# Patient Record
(demographics unavailable — no encounter records)

---

## 2017-07-11 NOTE — RADIOLOGY REPORT (SQ)
EXAM DESCRIPTION:  CT CHEST WITHOUT



COMPLETED DATE/TIME:  7/11/2017 9:20 am



REASON FOR STUDY:  LUNG CA (C34.11,C34.12) C34.11  MALIGNANT NEOPLASM OF UPPER LOBE, RIGHT BRONCHUS O
R L



COMPARISON:  CT dated 4/7/2017 and 4/4/2016.  PET-CT dated 9/30/2016 and 6/10/2016.



TECHNIQUE:  CT scan performed of the chest without intravenous contrast.  Images reviewed with lung, 
soft tissue and bone windows.  Reconstructed coronal and sagittal MPR images reviewed.  All images st
ored on PACS.

All CT scanners at this facility use dose modulation, iterative reconstruction, and/or weight based d
osing when appropriate to reduce radiation dose to as low as reasonably achievable (ALARA).

[facility]: Dose Right  [facility]: CareDose    [facility]: Dose Right    [facility]: Teradose 4D    [facility]: Smart Technologies



RADIATION DOSE:  Up-to-date CT equipment and radiation dose reduction techniques were employed. CTDIv
ol: 13.8 mGy. DLP: 497 mGy-cm. mGy.



LIMITATIONS:  No technical limitations.



FINDINGS:  LUNGS AND PLEURA: Stable surgical changes in both lungs.  Focal density in the posterior l
eft upper lobe, abutting the major fissure, with associated surgical clips, unchanged, most likely po
stoperative scar.  No new nodules or masses.  No infiltrates.  No pleural effusion or pleural thicken
ing.

HILAR AND MEDIASTINAL STRUCTURES: Stable 1.2 cm lymph node in the anterior mediastinum.  Otherwise no
 significant adenopathy.  No obvious aneurysm.

HEART AND VASCULAR STRUCTURES: No aneurysm.  No pericardial effusion.  Coronary artery calcifications
.

UPPER ABDOMEN: No significant findings.  Possible small gallstone.  Limited exam.

THYROID AND OTHER SOFT TISSUES: Stable thyromegaly with heterogenous appearance.  No adenopathy.

BONES: No significant finding.

HARDWARE: None in the chest.

OTHER: No other significant findings.



IMPRESSION:  STABLE APPEARANCE OF THE CHEST.  STABLE SURGICAL CHANGES IN BOTH LUNGS AND SMALL LYMPH N
ODE IN THE ANTERIOR MEDIASTINUM.  NO CT EVIDENCE OF PROGRESSION OR OTHER SIGNIFICANT CHANGE.



TECHNICAL DOCUMENTATION:  JOB ID:  [id number]

Quality ID # 436: Final reports with documentation of one or more dose reduction techniques (e.g., Au
tomated exposure control, adjustment of the mA and/or kV according to patient size, use of iterative 
reconstruction technique)

 2011 [employer]- All Rights Reserved

## 2017-08-04 NOTE — RADIOLOGY REPORT (SQ)
EXAM DESCRIPTION:  SOFT TISSUE NECK



COMPLETED DATE/TIME:  8/4/2017 2:09 pm



REASON FOR STUDY:  sore throat after pork



COMPARISON:  None.



NUMBER OF VIEWS:  Two views.



TECHNIQUE:  AP and lateral radiographic image of the soft tissues of the neck.



LIMITATIONS:  None.



FINDINGS:  EPIGLOTTIS: Normal.  Contour normal.  Aryepiglottic folds normal.

PREVERTEBRAL SOFT TISSUES: Normal.  No soft tissue swelling.

SUBGLOTTIC AREA: Normal.  No narrowing.

RETROPHARYNGEAL SPACE: Normal.  No soft tissue masses.

BONY STRUCTURES: Degenerative disc changes are present at C5-6 and C6-7.  Anterior osteophytes are pr
esent from C4-C7.

LUNG APICES: Normal.

OTHER: No radiopaque foreign bodies



IMPRESSION:  1.  Unremarkable soft tissues of the neck.

2.  Cervical degenerative disc disease and spondylosis.



TECHNICAL DOCUMENTATION:  JOB ID:  [id number]

 2011 [employer]- All Rights Reserved

## 2017-08-04 NOTE — ER DOCUMENT REPORT
ED General





- General


Chief Complaint: Sore Throat


Stated Complaint: SORE THROAT


Time Seen by Provider: 08/04/17 13:31


Mode of Arrival: Ambulatory


Information source: Patient


TRAVEL OUTSIDE OF THE [place] IN LAST 30 DAYS: No





- HPI


Patient complains to provider of: Sore throat, difficulty swallowing


Onset: This morning


Onset/Duration: Sudden


Quality of pain: Achy


Severity: Mild


Associated symptoms: Sore throat


Notes: 


Patient is a 73-year-old male who presents to the emergency room complaining of 

sore throat with foreign body sensation, states it started after he ate some 

pork chops at lunchtime, states he feels as though he has a piece of pork chop 

stuck in his throat, since then he has been able to eat some crackers, and 

drinks some juice without any difficulty, he still feels some irritation in the 

back of his throat, denies any difficulty breathing, no chest pain, no 

shortness of breath, no history of similar symptoms previously





- Related Data


Allergies/Adverse Reactions: 


 





No Known Allergies Allergy (Verified 08/04/17 12:57)


 











Past Medical History





- General


Information source: Patient





- Social History


Smoking Status: Former Smoker


Chew tobacco use (# tins/day): No


Frequency of alcohol use: None


Drug Abuse: None


Family History: Reviewed & Not Pertinent





- Past Medical History


Cardiac Medical History: Reports: Hx Hypertension


Renal/ Medical History: Denies: Hx Peritoneal Dialysis


Past Surgical History: Reports: Hx Inguinal Hernia





- Immunizations


Immunizations up to date: Yes


Hx Diphtheria, Pertussis, Tetanus Vaccination: Yes





Review of Systems





- Review of Systems


Constitutional: No symptoms reported


EENT: See HPI


Cardiovascular: No symptoms reported


Respiratory: No symptoms reported


Gastrointestinal: No symptoms reported


Genitourinary: No symptoms reported


Male Genitourinary: No symptoms reported


Musculoskeletal: No symptoms reported


Skin: No symptoms reported


Hematologic/Lymphatic: No symptoms reported


Neurological/Psychological: No symptoms reported


-: Yes All other systems reviewed and negative





Physical Exam





- Vital signs


Vitals: 


 











Temp Pulse Resp BP Pulse Ox


 


 98.8 F   69   18   149/70 H  99 


 


 08/04/17 12:55  08/04/17 12:55  08/04/17 12:55  08/04/17 12:55  08/04/17 12:55











Interpretation: Normal





- General


General appearance: Appears well, Alert





- HEENT


Head: Normocephalic, Atraumatic


Eyes: Normal


Conjunctiva: Normal


Extraocular movements intact: Yes


Eyelashes: Normal


Pupils: PERRL


Pharynx: Erythema.  No: Exudate, Tonsillar hypertrophy, Uvular edema, Potential 

airway comprom.


Neck: Normal





- Respiratory


Respiratory status: No respiratory distress


Chest status: Nontender


Breath sounds: Normal


Chest palpation: Normal





- Cardiovascular


Rhythm: Regular


Heart sounds: Normal auscultation


Murmur: No





- Abdominal


Inspection: Normal


Distension: No distension


Bowel sounds: Normal


Tenderness: Nontender


Organomegaly: No organomegaly





- Back


Back: Normal, Nontender





- Extremities


General upper extremity: Normal inspection, Nontender, Normal color, Normal ROM

, Normal temperature


General lower extremity: Normal inspection, Nontender, Normal color, Normal ROM

, Normal temperature, Normal weight bearing.  No: Antonio's sign





- Neurological


Neuro grossly intact: Yes


Cognition: Normal


Orientation: AAOx4


[place] Coma Scale Eye Opening: Spontaneous


Artemio Coma Scale Verbal: Oriented


Artemio Coma Scale Motor: Obeys Commands


[place] Coma Scale Total: 15


Speech: Normal


Motor strength normal: LUE, RUE, LLE, RLE


Sensory: Normal





- Psychological


Associated symptoms: Normal affect, Normal mood





- Skin


Skin Temperature: Warm


Skin Moisture: Dry


Skin Color: Normal





Course





- Re-evaluation


Re-evalutation: 


08/04/17 15:53


Patient reports feeling some improvement of symptoms since having a GI cocktail

, he was also able to drink a large glass of water afterwards, I believe 

symptoms are likely related to esophageal irritation from swallowing a large 

piece of pork that he initially thought was impacted, since he is able to drink 

a large amount of water is unlikely that he has any significant knee impaction 

at this point in time, therefore patient was discharged with instructions for 

follow-up with gastroenterology, advised to return if symptoms worsen, patient 

acknowledges understanding and agreement with this plan














- Vital Signs


Vital signs: 


 











Temp Pulse Resp BP Pulse Ox


 


 98.8 F   65   18   124/81   96 


 


 08/04/17 12:55  08/04/17 16:14  08/04/17 16:14  08/04/17 16:14  08/04/17 16:14














- Laboratory


Result Diagrams: 


 08/04/17 13:40





 08/04/17 13:40


Laboratory results interpreted by me: 


 











  08/04/17 08/04/17





  13:40 13:40


 


Hgb  13.1 L 


 


RDW  15.9 H 


 


BUN   21 H


 


Est GFR (Non-Af Amer)   57 L














- Diagnostic Test


Radiology reviewed: Image reviewed, Reports reviewed





Discharge





- Discharge


Clinical Impression: 


 Throat irritation





Condition: Stable


Disposition: HOME, SELF-CARE


Instructions:  Sore Throat ([facility]), Gastroenterology


Additional Instructions: 


Follow up with your primary care provider in one to 2 days.  Return to the 

emergency room immediately if symptoms worsen or any additional concerns.

## 2018-01-21 NOTE — RADIOLOGY REPORT (SQ)
EXAM DESCRIPTION:  CT CHEST WITHOUT



COMPLETED DATE/TIME:  1/20/2018 10:47 am



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



COMPARISON:  10/11/2017 and 7/11/2017.



TECHNIQUE:  CT scan performed of the chest without intravenous contrast.  Images reviewed with lung, 
soft tissue and bone windows.  Reconstructed coronal and sagittal MPR images reviewed.  All images st
ored on PACS.

All CT scanners at this facility use dose modulation, iterative reconstruction, and/or weight based d
osing when appropriate to reduce radiation dose to as low as reasonably achievable (ALARA).

[facility]: Dose Right  [facility]: CareDose    [facility]: Dose Right    [facility]: Teradose 4D    [facility]: Smart [employer]



RADIATION DOSE:  CT Rad equipment meets quality standard of care and radiation dose reduction techniq
ues were employed. CTDIvol: 17.8 mGy. DLP: 669 mGy-cm. mGy.



LIMITATIONS:  No technical limitations.



FINDINGS:  LUNGS AND PLEURA: Stable surgical changes with scarring.  Focal nodule in the left upper l
obe anterior to the major fissure unchanged, measuring 1 x 1.9 cm.  No new nodules or masses.  No ple
ural effusion or pleural thickening.

HILAR AND MEDIASTINAL STRUCTURES: Several lymph nodes in the paratracheal region and anterior mediast
inum are unchanged.  The largest anterior mediastinal lymph node measures 1.2 cm.  There is an enlarg
ing mass in the upper posterior mediastinum, adjacent to the T2 vertebral body, located to the right 
side of the esophagus, measuring 1.2 x 2.2 cm (series 3, image 9).

HEART AND VASCULAR STRUCTURES: No aneurysm.  No pericardial effusion.

UPPER ABDOMEN: No significant findings.  Limited exam.

THYROID AND OTHER SOFT TISSUES: Diffusely enlarged heterogenous thyroid.  Focal coarse calcification.
  No adenopathy.

BONES: No significant finding.

HARDWARE: None in the chest.

OTHER: No other significant findings.



IMPRESSION:

1. STABLE SURGICAL CHANGES IN THE LUNGS.  STABLE NODULARITY IN THE LEFT UPPER LOBE ADJACENT TO THE MA
STEFF FISSURE.  NO PROGRESSION.  NO NEW NODULES OR MASSES.

2. STABLE SHOTTY ADENOPATHY IN THE ANTERIOR MEDIASTINUM AND PARATRACHEAL REGION.

3. ENLARGING MASS IN THE UPPER POSTERIOR MEDIASTINUM AS DESCRIBED.  THIS PRESUMABLY REPRESENTS AN ENL
ARGING LYMPH NODE.  FURTHER EVALUATION WITH PET SCAN AND POSSIBLY MRI OF THE THORACIC SPINE MAY BE CO
NSIDERED.

4. DIFFUSE THYROMEGALY.



TECHNICAL DOCUMENTATION:  JOB ID:  [id number]

Quality ID # 436: Final reports with documentation of one or more dose reduction techniques (e.g., Au
tomated exposure control, adjustment of the mA and/or kV according to patient size, use of iterative 
reconstruction technique)

 2011 [employer]- All Rights Reserved
8

## 2018-01-29 NOTE — RADIOLOGY REPORT (SQ)
EXAM DESCRIPTION:  PET CT SKULL/THIGH



COMPLETED DATE/TIME:  1/28/2018 8:26 pm



REASON FOR STUDY:  LUNG CANCER C34.11  MALIGNANT NEOPLASM OF UPPER LOBE, RIGHT BRONCHUS OR L



COMPARISON:  9/30/2016



RADIONUCLIDE AND DOSE:  10.2 mCi F18 FDG

The route of agent administration: Intravenous



FASTING BLOOD SUGAR:  89 mg/dl



CONTRAST TYPE AND DOSE:  No CT contrast given.



TECHNIQUE:  Blood glucose level was verified.  Above dose of FDG was injected intravenously.  2-D seg
mented attenuation correction images were obtained from the base of the skull to the midthighs.  Nonc
ontrast CT images were obtained for attenuation correction and fusion with emission images.  CT image
s were performed without oral or intravenous contrast and are not sensitive for parenchymal lesions. 
 A series of overlapping emission PET images were obtained.  Images reviewed and manipulated at [facility] work station by the radiologist.  Images stored on PACS.



LIMITATIONS:  None.



FINDINGS:  HEAD AND NECK: No areas of abnormal metabolic activity in the soft tissues of the head and
 neck.

CHEST: Station 2R node measuring 1.4 x 2.6 cm and 10.0 SUV.

Pleural thickening right lower lobe measuring 5.8 SUV.  Margins roughly 1.2 x 2.2 cm.  There is a lyt
ic lesion in the adjacent rib.

ABDOMEN AND PELVIS: No areas of abnormal metabolic activity in the abdomen or pelvis.  Expected physi
ologic activity is present in the genitourinary system and bowel.

PROXIMAL LOWER EXTREMITIES: No areas of abnormal metabolic activity in the soft tissues of the lower 
extremities.

BONES: No abnormal metabolic activity in the visualized skeleton.

ADDITIONAL CT FINDINGS: Interval wedge resection of middle lobe and left apical nodules.  Residual sc
ar tissue left apex, non hypermetabolic.

Cholelithiasis.

OTHER: No other significant findings.



IMPRESSION:  Hypermetabolic station 2 R node.  Hypermetabolic pleural lesion right lower lobe with ad
jacent rib destruction.



TECHNICAL DOCUMENTATION:  JOB ID:  [id number]

 2011 [employer]- All Rights Reserved

## 2018-02-19 NOTE — RADIOLOGY REPORT (SQ)
EXAM DESCRIPTION:  PORTACATH INSERTION



COMPLETED DATE/TIME:  2/19/2018 10:50 am



REASON FOR STUDY:  C34.11 RT UPPER LOBE CA C34.11  MALIGNANT NEOPLASM OF UPPER LOBE, RIGHT BRONCHUS O
R L



COMPARISON:  AP chest 2/19/2018



FLUOROSCOPY TIME:  Less than 5 seconds

9 series of digital radiographic  images saved to PACS.



TECHNIQUE:  Intra-operative images acquired during surgical procedure to evaluate progress.

NUMBER OF IMAGES: 9 series of digital radiographic images



LIMITATIONS:  None.



FINDINGS:  Intra procedural imaging and fluoro during left-sided permanent central line placement



IMPRESSION:  Intra procedural imaging fluoro



COMMENT:  Quality :  Final reports for procedures using fluoroscopy that document radiation exp
osure indices, or exposure time and number of fluorographic images (if radiation exposure indices are
 not available)

Please consult full operative report of the attending physician for description of the procedure.



TECHNICAL DOCUMENTATION:  JOB ID:  [id number]

 2011 [employer]- All Rights Reserved

## 2018-02-19 NOTE — RADIOLOGY REPORT (SQ)
EXAM DESCRIPTION:  CHEST SINGLE VIEW



COMPLETED DATE/TIME:  2/19/2018 9:22 am



REASON FOR STUDY:  PREOP



COMPARISON:  None.



EXAM PARAMETERS:  NUMBER OF VIEWS: One view.

TECHNIQUE: Single frontal radiographic view of the chest acquired.

RADIATION DOSE: NA

LIMITATIONS: None.



FINDINGS:  LUNGS AND PLEURA: Chronic changes in the right base.  No acute opacities, masses or pneumo
thorax. No pleural effusion.

MEDIASTINUM AND HILAR STRUCTURES: No masses.  Contour normal.

HEART AND VASCULAR STRUCTURES: Heart normal in size.  Normal vasculature.

BONES: No acute findings.

HARDWARE: None in the chest.

OTHER: No other significant finding.



IMPRESSION:  Chronic changes in the medial right base without evidence of acute cardiopulmonary disea
se.



TECHNICAL DOCUMENTATION:  JOB ID:  [id number]

 2011 [employer]- All Rights Reserved

## 2018-02-19 NOTE — OPERATIVE REPORT
Operative Report


DATE OF SURGERY: 02/19/18


PREOPERATIVE DIAGNOSIS: #1 lung cancer, multiple comorbidities.


POSTOPERATIVE DIAGNOSIS: #1 lung cancer, multiple comorbidities.


OPERATION: 1.  Ultrasound evaluation of left internal jugular vein.  2.  Real-

time access in left internal jugular vein.  3.  Port-A-Cath placement via real-

time axis and left internal jugular vein.  4.  Angiogram and interpretation.


SURGEON: LENNOX WILLIAMS 1ST ASSISTANT: None.


ANESTHESIA: Moderate Sedation


TISSUE REMOVED OR ALTERED: Not applicable.


COMPLICATIONS: 





None.


ESTIMATED BLOOD LOSS: 5 mL.


INTRAOPERATIVE FINDINGS: Of a satisfactory left internal jugular vein support 

Port-A-Cath.  Good position of catheter with the tip just down in the right 

atrium.  Smooth flow of contrast through the right atrium, ventricle and 

pulmonary outflow tract.  Easy egress of blood and ingress of heparinized 

solution.


PROCEDURE: 








After obtaining informed consent, the patient was taken to the Cath Lab and 

positioned supine.  The left knee and chest were prepared with chlorhexidine 

and draped out with sterile linen.  After the " universal timeout", in which it 

was verified that the patient continued to receive antibiotic, the procedure 

commenced.  A steriley  sheathed ultrasound probe was used to evaluate the [

right] internal jugular vein.  Local anesthesia was infiltrated adjacent to the 

probe.  Access into the [right] internal jugular vein was obtained using a 

micropuncture needle, followed by micropuncture wire and then a micropuncture 

catheter.  This was followed by introduction of a 0.035 guidewire the tip of 

which was placed down into the inferior vena cava .  The port sites was marked 

, locally anesthetized and incision made.  Dissection now proceeded to the deep 

subcutaneous subcutaneous tissues so that a pocket for the port was made.  

Meticulous hemostasis was secured and the catheter was tunneled between the 2 

incisions.  Proximally, the catheter was  now positioned using a peel-away 

sheath.  Distally the catheter was tailored to an appropriate length and then 

mated to the port using the contained fixating device.  The port was now placed 

in the pocket and the catheter optimally positioned.  The port was accessed 

with a Ibarra needle and an angiogram done under digital subtraction.  The 

findings as dictated.





With adequate and satisfactory positioning, both lumens of the chamber were 

irrigated with heparinized solution.  The wounds were now closed using 

interrupted 3-0 PDS to the subcutaneous tissues and a continuous subcuticular 

suture of 4-0 Monocryl to the skin.  These are reinforced with Steri-Strips 

over benzoin and then dressings applied.





Time: 0.0 minute.





Dose: 14.9 m Gy





Contrast: 5 mls. Isovue 300.





 Copies of the dictated operative report for Dr. Lennox Williams MD.

## 2018-04-12 NOTE — RADIOLOGY REPORT (SQ)
EXAM DESCRIPTION:  CT CHEST WITH; CT ABD/PELVIS WITH IV ONLY



COMPLETED DATE/TIME:  4/12/2018 9:25 am



REASON FOR STUDY:  LUNG CA (C34.11) C34.11  MALIGNANT NEOPLASM OF UPPER LOBE, RIGHT BRONCHUS OR L



COMPARISON:  PET-CT 6/10/2016, 9/30/2016, 1/28/2018

CT chest 4/4/2016, 10/11/2017, 1/20/2018



CONTRAST TYPE AND DOSE:  contrast/concentration: Isovue 370.00 mg/ml; Total Contrast Delivered: 100.0
 ml; Total Saline Delivered: 72.0 ml



RENAL FUNCTION:  Creatinine 1.0



TECHNIQUE:  CT scan of the chest performed using helical scanning technique with dynamic intravenous 
contrast injection.  Images reviewed with lung, soft tissue and bone windows. Reconstructed coronal a
nd sagittal MPR images reviewed.  All images stored on PACS.

CT scan of the abdomen and pelvis performed with intravenous and without oral contrastusing helical s
tu technique with dynamic intravenous contrast injection.  Images reviewed with lung, soft tissu
e and bone windows.  Reconstructed coronal and sagittal MPR images reviewed.  Delayed images for eval
uation of the urinary system also acquired and evaluated. All images stored on PACS.

All CT scanners at this facility use dose modulation, iterative reconstruction, and/or weight based d
osing when appropriate to reduce radiation dose to as low as reasonably achievable (ALARA).

[facility]: Dose Right  [facility]: CareDose    [facility]: Dose Right    [facility]: Teradose 4D    [facility]: [employer]



RADIATION DOSE:  CT Rad equipment meets quality standard of care and radiation dose reduction techniq
ues were employed. CTDIvol: 12.8 - 16.7 mGy. DLP: 2296 mGy-cm. .



LIMITATIONS:  None.



FINDINGS:  CHEST:

LUNGS AND PLEURA: Post right middle lobe resection with staple line present.  No recurrent soft tissu
e along the staple line or right hilum.

Post wedge resection posterior aspect left upper lobe, with staple line.  There stable scarring in th
e inferior aspect of the staple line, measuring 2 x 1.2 cm in size, similar compared to 10/11/2017 an
d 1/28/2018.

HILAR AND MEDIASTINAL STRUCTURES: No identified masses or abnormal nodes.  The 2.6 x 1.4 cm lymph nod
e seen on 1/28/2018 has decreased in size significantly, now 0.6 x 0.6 cm on image 11.

HEART AND VASCULAR STRUCTURES: No aneurysm or dissection.  No central pulmonary emboli.  No pericardi
al effusion.

HARDWARE: Left permanent central line tip superior vena cava.

THYROID AND OTHER SOFT TISSUES: Stable thyromegaly

BONES: No significant finding.

OTHER: No other significant finding.

ABDOMEN AND PELVIS:

LIVER: Normal size. No masses.  No dilated ducts.

SPLEEN: Normal size. No focal lesions.

PANCREAS: No masses. No significant calcifications. No adjacent inflammation or peripancreatic fluid 
collections. Pancreatic duct not dilated.

GALLBLADDER: Tiny stones in the gallbladder, without gallbladder wall thickening or pericholecystic f
luid.

ADRENAL GLANDS: No significant masses or asymmetry.

RIGHT KIDNEY AND URETER: No solid masses.  Multiple subcentimeter right midpole cortical cysts.  No s
ignificant calcification. No hydronephrosis or hydroureter.

LEFT KIDNEY AND URETER: No solid masses.  Multiple subcentimeter left midpole cortical cysts.  No sig
nificant calcification. No hydronephrosis or hydroureter.

AORTA AND VESSELS: No abdominal aortic aneurysm. No abdominal aortic dissection. Renal arteries, SMA,
 celiac without stenosis.  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
rtery 3 cm in diameter.

RETROPERITONEUM: No retroperitoneal adenopathy, hemorrhage or masses.

BOWEL AND PERITONEAL CAVITY: No masses or inflammatory changes. No free fluid or peritoneal masses.

APPENDIX: Normal.

ABDOMINAL WALL: Intact umbilical hernia repair

PELVIS: No mass or free fluid.  Normal bladder.

BONES: No significant or acute findings.

OTHER: No other significant finding.



IMPRESSION:  Decrease in size of upper mediastinal lymph node seen on 1/28/2018, currently 0.6 cm in 
greatest diameter

Stable post therapeutic changes in the chest

No CT evidence of metastatic disease to the abdomen or pelvis.  Stable right common iliac artery aneu
rysm 3 cm in diameter.  Tiny gallstones.



TECHNICAL DOCUMENTATION:  JOB ID:  [id number]

Quality ID # 436: Final reports with documentation of one or more dose reduction techniques (e.g., Au
tomated exposure control, adjustment of the mA and/or kV according to patient size, use of iterative 
reconstruction technique)

 2011 [employer]- All Rights Reserved



Reading location - IP/workstation name: [facility]

## 2018-06-11 NOTE — RADIOLOGY REPORT (SQ)
EXAM DESCRIPTION:  PET CT SKULL/THIGH



COMPLETED DATE/TIME:  6/10/2018 8:30 pm



REASON FOR STUDY:  LUNG CANCER C34.11  MALIGNANT NEOPLASM OF UPPER LOBE, RIGHT BRONCHUS OR L



COMPARISON:  CT chest abdomen pelvis 4/12/2018

PET-CT 1/28/2018, 9/30/2016



RADIONUCLIDE AND DOSE:  11.5 mCi F18 FDG

The route of agent administration: Intravenous



FASTING BLOOD SUGAR:  94 mg/dl



CONTRAST TYPE AND DOSE:  No CT contrast given.



TECHNIQUE:  Blood glucose level was verified.  Above dose of FDG was injected intravenously.  2-D seg
mented attenuation correction images were obtained from the base of the skull to the midthighs.  Nonc
ontrast CT images were obtained for attenuation correction and fusion with emission images.  CT image
s were performed without oral or intravenous contrast and are not sensitive for parenchymal lesions. 
 A series of overlapping emission PET images were obtained.  Images reviewed and manipulated at [facility] work station by the radiologist.  Images stored on PACS.



LIMITATIONS:  None.



FINDINGS:  HEAD AND NECK: No areas of abnormal metabolic activity in the soft tissues of the head and
 neck.

CHEST: No metabolically active mediastinal adenopathy.  Previously biopsied hypermetabolic 2R mediast
inal lymph node seen on 1/28/2018 is no longer identified.  There is 2 x 1 cm bandlike scarring along
 the posterior aspect left upper lobe post wedge resection of a nodule.  This is non metabolic.  Cee
ent is post right middle lobe resection.  No metabolically active tissue is seen along the right midd
le lobe staple line.

ABDOMEN AND PELVIS: No areas of abnormal metabolic activity in the abdomen or pelvis.  Expected physi
ologic activity is present in the genitourinary system and bowel.

PROXIMAL LOWER EXTREMITIES: No areas of abnormal metabolic activity in the soft tissues of the lower 
extremities.

BONES: No abnormal metabolic activity in the visualized skeleton.

ADDITIONAL CT FINDINGS: Stones in the gallbladder.  Intact ventral hernia repair.  3 cm aneurysm righ
t common iliac artery.  Diffuse thyromegaly.  Left permanent central line tip superior vena cava.

OTHER: No other significant findings.



IMPRESSION:  No hypermetabolic lesions worrisome for residual or recurrent tumor.



TECHNICAL DOCUMENTATION:  JOB ID:  [id number]

 2011 [employer]- All Rights Reserved



Reading location - IP/workstation name: [facility]

## 2018-08-06 NOTE — RADIOLOGY REPORT (SQ)
EXAM DESCRIPTION:  CT ABD/PELVIS WITH IV   ORAL



COMPLETED DATE/TIME:  8/6/2018 8:45 am



REASON FOR STUDY:  MALIGNANT NEOPLASM OF UPPER LOBE, RIGHT BRONCHUS OR LUNG C34.11  MALIGNANT NEOPLAS
M OF UPPER LOBE, RIGHT BRONCHUS OR L



COMPARISON:  4/12/2018



TECHNIQUE:  CT scan of the abdomen and pelvis performed using helical scanning technique with dynamic
 intravenous contrast injection.  No oral contrast. Images reviewed with lung, soft tissue, and bone 
windows. Reconstructed coronal and sagittal MPR images reviewed. Delayed images for evaluation of the
 urinary system also acquired. All images stored on PACS.

All CT scanners at this facility use dose modulation, iterative reconstruction, and/or weight based d
osing when appropriate to reduce radiation dose to as low as reasonably achievable (ALARA).

[facility]: Dose Right  [facility]: CareDose    [facility]: Dose Right    [facility]: Teradose 4D    [facility]: Smart Technologies



CONTRAST TYPE AND DOSE:  contrast/concentration: Isovue 370.00 mg/ml; Total Contrast Delivered: 100.0
 ml; Total Saline Delivered: 72.0 ml



RENAL FUNCTION:  Creatinine -0.9

BUN=11



RADIATION DOSE:  CT Rad equipment meets quality standard of care and radiation dose reduction techniq
ues were employed. CTDIvol: 11.2 - 13.5 mGy. DLP: 1834 mGy-cm..



LIMITATIONS:  None.



FINDINGS:  LOWER CHEST:  Please see CT chest report.

LIVER:  Stable appearing homogeneous enhancing density in the liver parenchyma near the region of the
 gallbladder fossa, axial image 23, series 3.  This finding is not well visualized on the post contra
st images, probably on a benign basis.  No dilated ducts.  The hepatic and portal veins are patent.

SPLEEN: Normal size. No focal lesions.

PANCREAS: No masses. No significant calcifications. No adjacent inflammation or peripancreatic fluid 
collections. Pancreatic duct not dilated.

GALLBLADDER:  Small stable gallstones.  No inflammatory changes to suggest cholecystitis.

ADRENAL GLANDS: No significant masses or asymmetry.

RIGHT KIDNEY AND URETER:  Stable complex slightly heterogenous well-circumscribed 1.2 cm x 1.2 cm les
ion in the posteromedial aspect of the mid-lower pole of the right kidney with CT numbers above water
 range, axial image 36, series 8.  There are other smaller stable appearing hypoattenuated lesions ma
y represent cysts.  No hydronephrosis or hydroureter.

LEFT KIDNEY AND URETER:  Stable left renal cysts.  Stable focal cortical scarring along the medial as
pect of the lower pole of the kidney. No hydronephrosis or hydroureter.

AORTA AND VESSELS:  Atherosclerotic changes involving the abdominal aorta and branch vessels.  Stable
 appearing right proximal common iliac artery aneurysm measures 3.0 cm in diameter. No dissection. Re
nal arteries, SMA, celiac without stenosis.

RETROPERITONEUM: No retroperitoneal adenopathy, hemorrhage or masses.

BOWEL AND PERITONEAL CAVITY: No masses or inflammatory changes. No free fluid or peritoneal masses.

APPENDIX: Normal.

PELVIS:  Prior prostatectomy. No free fluid.  Normal bladder.

ABDOMINAL WALL:  Stable post surgical changes right inguinal region may be related to prior hernia re
pair.

BONES:  The osseous structures are stable in appearance.

OTHER: No other significant finding.



IMPRESSION:  1 No significant interval changes since the prior examination dated 4/12/2018.

2.  Stable appearing right proximal common iliac artery aneurysm measures 3.0 cm in diameter.

3.  Bilateral renal cysts.  Stable appearing slightly heterogenous, complex lesion in the mid-lower p
ole of the right kidney.  Further evaluation with Renal ultrasound suggested.

4.  Very small gallstones, stable finding.

5.  Additional stable findings as above.



TECHNICAL DOCUMENTATION:  JOB ID:  [id number]

Quality ID # 436: Final reports with documentation of one or more dose reduction techniques (e.g., Au
tomated exposure control, adjustment of the mA and/or kV according to patient size, use of iterative 
reconstruction technique)

 2011 [employer]- All Rights Reserved



Reading location - IP/workstation name: [email or web address]

## 2018-08-06 NOTE — RADIOLOGY REPORT (SQ)
EXAM DESCRIPTION:  CT CHEST WITH



COMPLETED DATE/TIME:  8/6/2018 8:45 am



REASON FOR STUDY:  MALIGNANT NEOPLASM OF UPPER LOBE, RIGHT BRONCHUS OR LUNG C34.11  MALIGNANT NEOPLAS
M OF UPPER LOBE, RIGHT BRONCHUS OR L



COMPARISON:  4/12/2018



TECHNIQUE:  CT scan of the chest performed using helical scanning technique with dynamic intravenous 
contrast injection.  Images reviewed with lung, soft tissue and bone windows.  Reconstructed coronal 
and sagittal MPR images reviewed.  All images stored on PACS.

All CT scanners at this facility use dose modulation, iterative reconstruction, and/or weight based d
osing when appropriate to reduce radiation dose to as low as reasonably achievable (ALARA).

[facility]: Dose Right  [facility]: CareDose    [facility]: Dose Right    [facility]: Teradose 4D    [facility]: Smart [employer]



CONTRAST TYPE AND DOSE:  100 cc Isovue 370



RENAL FUNCTION:  Creatinine- 0.9

BUN=11



RADIATION DOSE:  Total exam DLP:  1834.21 mGy



LIMITATIONS:  None.



FINDINGS:  LUNGS AND PLEURA:  Stable post operative changes in the right upper-middle lobe.  Stable p
ost operative  changes in the left upper lobe.  As on the prior examination, stable soft tissue densi
ty along the inferior aspect of the staple line, may represent scar. Mild emphysematous changes in th
e lungs.  No pneumothorax or pleural effusion.  The central airways are clear.

HILAR AND MEDIASTINAL STRUCTURES:  Stable appearing mediastinal lymph nodes.

HEART AND VASCULAR STRUCTURES:  Mild atherosclerotic changes involving the abdominal aorta.  Stable m
ild dilatation of the ascending thoracic aorta which measures 4.1-4.2 cm in diameter at the level of 
the main pulmonary artery.  No dissection.  No central pulmonary emboli.  No pericardial effusion.

HARDWARE:  Stable Left Zvrjbn-B-Qvuh catheter.

UPPER ABDOMEN:  Please see CT abdomen report.

THYROID AND OTHER SOFT TISSUES:  The thyroid gland is stable in appearance.  As on the prior examinat
ion, the thyroid gland is prominent in size with multiple hypoattenuated nodules, some of which are h
eterogenous in appearance.

BONES:  The osseous structures are stable in appearance.

OTHER: No other significant finding.



IMPRESSION:  1 No significant interval changes since the prior examination dated 4/12/2018.

2. Stable post operative changes in the lungs.

3.  Additional stable findings as above.



TECHNICAL DOCUMENTATION:  JOB ID:  [id number]

Quality ID # 436: Final reports with documentation of one or more dose reduction techniques (e.g., Au
tomated exposure control, adjustment of the mA and/or kV according to patient size, use of iterative 
reconstruction technique)

 2011 [employer]- All Rights Reserved



Reading location - IP/workstation name: [email or web address]

## 2018-10-04 NOTE — RADIOLOGY REPORT (SQ)
EXAM DESCRIPTION:  VENOUS UNILATERAL UPPER



COMPLETED DATE/TIME:  10/4/2018 2:44 pm



REASON FOR STUDY:  RUE SWELLING M79.609  PAIN IN UNSPECIFIED LIMB M79.89  OTHER SPECIFIED SOFT TISSUE
 DISORDERS



COMPARISON:  None.



TECHNIQUE:  Dynamic and static gray scale and color images acquired of the right arm venous system. S
elected spectral images acquired with additional compression and augmentation maneuvers. The contrala
teral subclavian vein and internal jugular vein were also imaged. Images stored on PACS.



LIMITATIONS:  None.



FINDINGS:  INTERNAL JUGULAR VEIN: Normal phasicity, compression, augmentation. No visualized echogeni
c material on gray scale. No defects on color images. Comparison opposite side normal.

SUBCLAVIAN VEIN: Normal compression, augmentation. No visualized echogenic material on gray scale. No
 defects on color images.

AXILLARY VEIN: Normal compression, augmentation. No visualized echogenic material on gray scale. No d
efects on color images.

BRACHIAL VEIN: Normal compression, augmentation. No visualized echogenic material on gray scale. No d
efects on color images.

BASILIC VEIN: Normal compression, augmentation. No visualized echogenic material on gray scale. No de
fects on color images.

CEPHALIC VEIN: Normal compression, augmentation. No visualized echogenic material on gray scale. No d
efects on color images.

OTHER: No other significant finding.

CONTRALATERAL SUBCLAVIAN VEIN AND INTERNAL JUGULAR VEIN:

Normal phasicity, compression and augmentation. No visualized echogenic material on gray scale. No de
fects on color images.



IMPRESSION:  NO EVIDENCE DVT OR SVT IN THE RIGHT ARM.



TECHNICAL DOCUMENTATION:  JOB ID:  [id number]

 2011 [employer]- All Rights Reserved



Reading location - IP/workstation name: [email or web address]

## 2018-10-29 NOTE — RADIOLOGY REPORT (SQ)
EXAM DESCRIPTION:  CT CHEST WITH



COMPLETED DATE/TIME:  10/29/2018 9:09 am



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 
NEOPLASM OF UPPER LOBE, RIGHT BRONCHUS OR L



COMPARISON:  8/6/2018 and 4/12/2018.



TECHNIQUE:  CT scan of the chest performed using helical scanning technique with dynamic intravenous 
contrast injection.  Images reviewed with lung, soft tissue and bone windows.  Reconstructed coronal 
and sagittal MPR and MIP images reviewed.  All images stored on PACS.

All CT scanners at this facility use dose modulation, iterative reconstruction, and/or weight based d
osing when appropriate to reduce radiation dose to as low as reasonably achievable (ALARA).

[facility]: Dose Right  [facility]: CareDose    [facility]: Dose Right    [facility]: Teradose 4D    [facility]: [employer]



CONTRAST TYPE AND DOSE:  100 mL Omnipaque 350- low osmolar.



RENAL FUNCTION:  BUN 16 creatinine 1.1.



RADIATION DOSE:   .



LIMITATIONS:  None.



FINDINGS:  LUNGS AND PLEURA: Stable surgical changes.  Chronic parenchymal scarring.  Focal scar in t
he posterior left upper lobe unchanged.  No new nodules or masses.  No pleural effusion.  No pleural 
thickening or calcification.

HILAR AND MEDIASTINAL STRUCTURES: No identified masses or abnormal nodes.

HEART AND VASCULAR STRUCTURES: No aneurysm or dissection.  No central pulmonary emboli.  No pericardi
al effusion.

HARDWARE: Vascular port.

UPPER ABDOMEN: No significant findings.  Limited exam.

THYROID AND OTHER SOFT TISSUES: Stable enlargement of the thyroid with heterogenous nodular appearanc
e.  No adenopathy.

BONES: No significant finding.

OTHER: No other significant finding.



IMPRESSION:

1. STABLE CT OF THE CHEST WITH IV CONTRAST.  SURGICAL CHANGES WITH AREAS OF SCARRING, PARTICULARLY IN
 THE POSTERIOR LEFT UPPER LOBE, UNCHANGED.  NO NEW NODULES OR MASSES.  NO EVIDENCE OF RESIDUAL OR REC
URRENT DISEASE OR METASTASIS.

2. STABLE APPEARANCE OF THE THYROID, PRESUMABLY MULTINODULAR GOITER.



TECHNICAL DOCUMENTATION:  JOB ID:  [id number]

Quality ID # 436: Final reports with documentation of one or more dose reduction techniques (e.g., Au
tomated exposure control, adjustment of the mA and/or kV according to patient size, use of iterative 
reconstruction technique)

 2011 [employer]- All Rights Reserved



Reading location - IP/workstation name: [facility]

## 2018-10-29 NOTE — RADIOLOGY REPORT (SQ)
EXAM DESCRIPTION:  CT ABD/PELVIS WITH IV ONLY



COMPLETED DATE/TIME:  10/29/2018 9:09 am



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 
NEOPLASM OF UPPER LOBE, RIGHT BRONCHUS OR L



COMPARISON:  8/6/2018 and 4/12/2018.



TECHNIQUE:  CT scan of the abdomen and pelvis performed using helical scanning technique with dynamic
 intravenous contrast injection.  No oral contrast. Images reviewed with lung, soft tissue, and bone 
windows. Reconstructed coronal and sagittal MPR images reviewed. Delayed images for evaluation of the
 urinary system also acquired. All images stored on PACS.

All CT scanners at this facility use dose modulation, iterative reconstruction, and/or weight based d
osing when appropriate to reduce radiation dose to as low as reasonably achievable (ALARA).

[facility]: Dose Right  [facility]: CareDose    [facility]: Dose Right    [facility]: Teradose 4D    [facility]: [employer]



CONTRAST TYPE AND DOSE:  contrast/concentration: Isovue 350.00 mg/ml; Total Contrast Delivered: 100.0
 ml; Total Saline Delivered: 72.0 ml



RENAL FUNCTION:  BUN 16 creatinine 1.1.



RADIATION DOSE:  CT Rad equipment meets quality standard of care and radiation dose reduction techniq
ues were employed. CTDIvol: 11.1 - 12.8 mGy. DLP: 1839 mGy-cm..



LIMITATIONS:  None.



FINDINGS:  LOWER CHEST: No significant findings. No nodules or infiltrates.

LIVER: Normal size. No masses.  No dilated ducts.

SPLEEN: Normal size. No focal lesions.

PANCREAS: No masses. No significant calcifications. No adjacent inflammation or peripancreatic fluid 
collections. Pancreatic duct not dilated.

GALLBLADDER: Gallstones. No inflammatory changes to suggest cholecystitis.

ADRENAL GLANDS: No significant masses or asymmetry.

RIGHT KIDNEY AND URETER: Stable small cortical cysts.  No solid masses.   No significant calcificatio
ns.   No hydronephrosis or hydroureter.

LEFT KIDNEY AND URETER: Stable small cortical cysts.  No solid masses.   No significant calcification
s.   No hydronephrosis or hydroureter.

AORTA AND VESSELS: No aortic aneurysm.  Stable 3.0 cm aneurysm of the right common iliac artery.  No 
dissection. Renal arteries, SMA, celiac without stenosis.

RETROPERITONEUM: No retroperitoneal adenopathy, hemorrhage or masses.

BOWEL AND PERITONEAL CAVITY: No masses or inflammatory changes. No free fluid or peritoneal masses.

APPENDIX: Normal.

PELVIS: No mass.  No free fluid. Normal bladder.

ABDOMINAL WALL: No masses. No hernias.

BONES: No significant or acute findings.

OTHER: No other significant finding.



IMPRESSION:

1. 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

2. GALLSTONES.

3. SMALL CYSTS IN BOTH KIDNEYS.

4. NO EVIDENCE OF METASTATIC INVOLVEMENT IN THE ABDOMEN OR PELVIS.  NO OTHER SIGNIFICANT OR ACUTE FIN
DING IN THE ABDOMEN OR PELVIS ON CT SCAN WITH IV CONTRAST.



TECHNICAL DOCUMENTATION:  JOB ID:  [id number]

Quality ID # 436: Final reports with documentation of one or more dose reduction techniques (e.g., Au
tomated exposure control, adjustment of the mA and/or kV according to patient size, use of iterative 
reconstruction technique)

 2011 [employer]- All Rights Reserved



Reading location - IP/workstation name: [email or web address]

## 2019-01-21 NOTE — RADIOLOGY REPORT (SQ)
EXAM DESCRIPTION:  CT CHEST WITH; CT ABD/PELVIS WITH IV ONLY



COMPLETED DATE/TIME:  1/21/2019 8:23 am



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



COMPARISON:  PET-CT 6/10/2018

CT chest abdomen and pelvis 10/29/2018, 8/6/2018, 4/12/2018



CONTRAST TYPE AND DOSE:  contrast/concentration: Isovue 350.00 mg/ml; Total Contrast Delivered: 100.0
 ml; Total Saline Delivered: 27.9 ml



RENAL FUNCTION:  Creatinine 1.0



TECHNIQUE:  CT scan of the chest performed using helical scanning technique with dynamic intravenous 
contrast injection.  Images reviewed with lung, soft tissue and bone windows. Reconstructed coronal a
nd sagittal MPR images reviewed.  All images stored on PACS.

CT scan of the abdomen and pelvis performed with intravenous and without oral contrastusing helical s
tu technique with dynamic intravenous contrast injection.  Images reviewed with lung, soft tissu
e and bone windows.  Reconstructed coronal and sagittal MPR images reviewed.  Delayed images for eval
uation of the urinary system also acquired and evaluated. All images stored on PACS.

All CT scanners at this facility use dose modulation, iterative reconstruction, and/or weight based d
osing when appropriate to reduce radiation dose to as low as reasonably achievable (ALARA).

[facility]: Dose Right  [facility]: CareDose    [facility]: Dose Right    [facility]: Teradose 4D    [facility]: [employer]



RADIATION DOSE:  CT Rad equipment meets quality standard of care and radiation dose reduction techniq
ues were employed. CTDIvol: 12.7 - 14.1 mGy. DLP: 1984 mGy-cm. .



LIMITATIONS:  None.



FINDINGS:  CHEST:

LUNGS AND PLEURA: No opacities, nodules, masses.  No pneumothorax. No effusions.  Post right middle l
obe resection.  Post left upper lobe wedge resection.

HILAR AND MEDIASTINAL STRUCTURES: No identified masses or abnormal nodes.

HEART AND VASCULAR STRUCTURES: No aneurysm or dissection.  No central pulmonary emboli.  No pericardi
al effusion.  Spotty coronary artery calcification

HARDWARE: Left permanent central line tip superior vena cava

THYROID AND OTHER SOFT TISSUES: Diffuse thyromegaly, stable.

BONES: Degenerative disc changes lower thoracic spine

OTHER: No other significant finding.

ABDOMEN AND PELVIS:

LIVER: Normal size. No masses.  No dilated ducts.

SPLEEN: Normal size. No focal lesions.

PANCREAS: No masses. No significant calcifications. No adjacent inflammation or peripancreatic fluid 
collections. Pancreatic duct not dilated.

GALLBLADDER: Tiny stones in the gallbladder.  No gallbladder wall thickening or pericholecystic fluid


ADRENAL GLANDS: No significant masses or asymmetry.

RIGHT KIDNEY AND URETER: No solid masses.  Subcentimeter cysts right kidney.  No significant calcific
ation. No hydronephrosis or hydroureter.

LEFT KIDNEY AND URETER: No solid masses.  Subcentimeter cysts left kidney.  No significant calcificat
ion. No hydronephrosis or hydroureter.

AORTA AND VESSELS: 3 cm right proximal common iliac artery aneurysm, stable compared to prior studies
.  No abdominal aortic aneurysm. No abdominal aortic dissection. Renal arteries, SMA, celiac without 
stenosis.

RETROPERITONEUM: No retroperitoneal adenopathy, hemorrhage or masses.

BOWEL AND PERITONEAL CAVITY: No masses or inflammatory changes. No free fluid or peritoneal masses.

APPENDIX: Normal.

ABDOMINAL WALL: Intact ventral hernia repair

PELVIS: No mass or free fluid.  Normal bladder.

BONES: No significant or acute findings.

OTHER: No other significant finding.



IMPRESSION:  No CT evidence of metastatic disease to the chest abdomen or pelvis



TECHNICAL DOCUMENTATION:  JOB ID:  [id number]

Quality ID # 436: Final reports with documentation of one or more dose reduction techniques (e.g., Au
tomated exposure control, adjustment of the mA and/or kV according to patient size, use of iterative 
reconstruction technique)

 2011 [employer]- All Rights Reserved



Reading location - IP/workstation name: [email or web address]

## 2019-01-21 NOTE — RADIOLOGY REPORT (SQ)
EXAM DESCRIPTION:  CT CHEST WITH; CT ABD/PELVIS WITH IV ONLY



COMPLETED DATE/TIME:  1/21/2019 8:23 am



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



COMPARISON:  PET-CT 6/10/2018

CT chest abdomen and pelvis 10/29/2018, 8/6/2018, 4/12/2018



CONTRAST TYPE AND DOSE:  contrast/concentration: Isovue 350.00 mg/ml; Total Contrast Delivered: 100.0
 ml; Total Saline Delivered: 27.9 ml



RENAL FUNCTION:  Creatinine 1.0



TECHNIQUE:  CT scan of the chest performed using helical scanning technique with dynamic intravenous 
contrast injection.  Images reviewed with lung, soft tissue and bone windows. Reconstructed coronal a
nd sagittal MPR images reviewed.  All images stored on PACS.

CT scan of the abdomen and pelvis performed with intravenous and without oral contrastusing helical s
tu technique with dynamic intravenous contrast injection.  Images reviewed with lung, soft tissu
e and bone windows.  Reconstructed coronal and sagittal MPR images reviewed.  Delayed images for eval
uation of the urinary system also acquired and evaluated. All images stored on PACS.

All CT scanners at this facility use dose modulation, iterative reconstruction, and/or weight based d
osing when appropriate to reduce radiation dose to as low as reasonably achievable (ALARA).

[facility]: Dose Right  [facility]: CareDose    [facility]: Dose Right    [facility]: Teradose 4D    [facility]: Smart Technologies



RADIATION DOSE:  CT Rad equipment meets quality standard of care and radiation dose reduction techniq
ues were employed. CTDIvol: 12.7 - 14.1 mGy. DLP: 1984 mGy-cm. .



LIMITATIONS:  None.



FINDINGS:  CHEST:

LUNGS AND PLEURA: No opacities, nodules, masses.  No pneumothorax. No effusions.  Post right middle l
obe resection.  Post left upper lobe wedge resection.

HILAR AND MEDIASTINAL STRUCTURES: No identified masses or abnormal nodes.

HEART AND VASCULAR STRUCTURES: No aneurysm or dissection.  No central pulmonary emboli.  No pericardi
al effusion.  Spotty coronary artery calcification

HARDWARE: Left permanent central line tip superior vena cava

THYROID AND OTHER SOFT TISSUES: Diffuse thyromegaly, stable.

BONES: Degenerative disc changes lower thoracic spine

OTHER: No other significant finding.

ABDOMEN AND PELVIS:

LIVER: Normal size. No masses.  No dilated ducts.

SPLEEN: Normal size. No focal lesions.

PANCREAS: No masses. No significant calcifications. No adjacent inflammation or peripancreatic fluid 
collections. Pancreatic duct not dilated.

GALLBLADDER: Tiny stones in the gallbladder.  No gallbladder wall thickening or pericholecystic fluid


ADRENAL GLANDS: No significant masses or asymmetry.

RIGHT KIDNEY AND URETER: No solid masses.  Subcentimeter cysts right kidney.  No significant calcific
ation. No hydronephrosis or hydroureter.

LEFT KIDNEY AND URETER: No solid masses.  Subcentimeter cysts left kidney.  No significant calcificat
ion. No hydronephrosis or hydroureter.

AORTA AND VESSELS: 3 cm right proximal common iliac artery aneurysm, stable compared to prior studies
.  No abdominal aortic aneurysm. No abdominal aortic dissection. Renal arteries, SMA, celiac without 
stenosis.

RETROPERITONEUM: No retroperitoneal adenopathy, hemorrhage or masses.

BOWEL AND PERITONEAL CAVITY: No masses or inflammatory changes. No free fluid or peritoneal masses.

APPENDIX: Normal.

ABDOMINAL WALL: Intact ventral hernia repair

PELVIS: No mass or free fluid.  Normal bladder.

BONES: No significant or acute findings.

OTHER: No other significant finding.



IMPRESSION:  No CT evidence of metastatic disease to the chest abdomen or pelvis



TECHNICAL DOCUMENTATION:  JOB ID:  [id number]

Quality ID # 436: Final reports with documentation of one or more dose reduction techniques (e.g., Au
tomated exposure control, adjustment of the mA and/or kV according to patient size, use of iterative 
reconstruction technique)

 2011 [employer]- All Rights Reserved



Reading location - IP/workstation name: [email or web address]

## 2019-03-19 NOTE — RADIOLOGY REPORT (SQ)
EXAM DESCRIPTION:  CT ABD/PELVIS WITH IV ONLY



COMPLETED DATE/TIME:  3/19/2019 8:42 am



REASON FOR STUDY:  C34.11 MALIGNANT NEOPLASM OF UPPER LOBE,RIGHT BRONCHUS OR LUNG C34.11  MALIGNANT N
EOPLASM OF UPPER LOBE, RIGHT BRONCHUS OR L



COMPARISON:  None.



TECHNIQUE:  CT scan of the abdomen and pelvis performed using helical scanning technique with dynamic
 intravenous contrast injection.  No oral contrast. Images reviewed with lung, soft tissue, and bone 
windows. Reconstructed coronal and sagittal MPR images reviewed. Delayed images for evaluation of the
 urinary system also acquired. All images stored on PACS.

All CT scanners at this facility use dose modulation, iterative reconstruction, and/or weight based d
osing when appropriate to reduce radiation dose to as low as reasonably achievable (ALARA).

[facility]: Dose Right  [facility]: CareDose    [facility]: Dose Right    [facility]: Teradose 4D    [facility]: Smart [employer]



CONTRAST TYPE AND DOSE:  contrast/concentration: Isovue 350.00 mg/ml; Total Contrast Delivered: 100.0
 ml; Total Saline Delivered: 72.0 ml

100 cc Omnipaque 350



RENAL FUNCTION:  See chart



RADIATION DOSE:  CT Rad equipment meets quality standard of care and radiation dose reduction techniq
ues were employed. CTDIvol: 12.2 - 14.0 mGy. DLP: 1951 mGy-cm..



LIMITATIONS:  None.



FINDINGS:  LOWER CHEST: See separate report of the CT of the chest.

LIVER: Unchanged increased attenuation adjacent to the gallbladder fossa, likely fatty sparing.  No n
ew hepatic lesions.  No intrahepatic ductal dilation.

SPLEEN: Normal size. No focal lesions.

PANCREAS: No masses. No significant calcifications. No adjacent inflammation or peripancreatic fluid 
collections. Pancreatic duct not dilated.

GALLBLADDER: Cholelithiasis likely.  No wall thickening or pericholecystic fluid.

ADRENAL GLANDS: No significant masses or asymmetry.

RIGHT KIDNEY AND URETER: Unchanged subcentimeter lower pole hypo dense lesion, likely cyst.   No sign
ificant calcifications.   No hydronephrosis or hydroureter.

LEFT KIDNEY AND URETER: Unchanged lower pole hypodense cyst.   No significant calcifications.   No hy
dronephrosis or hydroureter.

AORTA AND VESSELS: Aortoiliac atherosclerosis with unchanged dilation ectasia of the bilateral proxim
al iliac arteries measuring up to 3.0 cm on the right.  .

RETROPERITONEUM: No retroperitoneal adenopathy, hemorrhage or masses.

BOWEL AND PERITONEAL CAVITY: No masses or inflammatory changes. No free fluid or peritoneal masses.

APPENDIX: Normal.

PELVIS: No mass.  No free fluid. Normal bladder.

ABDOMINAL WALL: No masses.  Evidence of prior ventral hernia repair.  .

BONES: Lower lumbar facet arthropathy.  No acute bony abnormality.

OTHER: No other significant finding.



IMPRESSION:  No evidence of metastatic disease within the abdomen or pelvis.

Additional chronic findings as above.



TECHNICAL DOCUMENTATION:  JOB ID:  [id number]

Quality ID # 436: Final reports with documentation of one or more dose reduction techniques (e.g., Au
tomated exposure control, adjustment of the mA and/or kV according to patient size, use of iterative 
reconstruction technique)

 2011 [employer]- All Rights Reserved



Reading location - IP/workstation name: [email or web address]

## 2019-03-19 NOTE — RADIOLOGY REPORT (SQ)
EXAM DESCRIPTION:  CT CHEST WITH



COMPLETED DATE/TIME:  3/19/2019 8:40 am



REASON FOR STUDY:  C34.11 MALIGNANT NEOPLASM OF UPPER LOBE,RIGHT BRONCHUS OR LUNG C34.11  MALIGNANT N
EOPLASM OF UPPER LOBE, RIGHT BRONCHUS OR L



COMPARISON:  1/21/2019



TECHNIQUE:  CT scan of the chest performed using helical scanning technique with dynamic intravenous 
contrast injection.  Images reviewed with lung, soft tissue and bone windows.  Reconstructed coronal 
and sagittal MPR and MIP images reviewed.  All images stored on PACS.

All CT scanners at this facility use dose modulation, iterative reconstruction, and/or weight based d
osing when appropriate to reduce radiation dose to as low as reasonably achievable (ALARA).

[facility]: Dose Right  [facility]: CareDose    [facility]: Dose Right    [facility]: Teradose 4D    [facility]: [employer]



CONTRAST TYPE AND DOSE:  172 cc Omnipaque 350



RENAL FUNCTION:  See chart



RADIATION DOSE:   .



LIMITATIONS:  None.



FINDINGS:  LUNGS AND PLEURA: Stable nodular scarring from prior left upper lobe wedge resection.  Pos
tsurgical changes from the right middle lobe resection.  There is lobular pleural thickening measurin
g 36 x 15 mm (series 2, image 24) along the right posterolateral hemithorax at the level of the 7th r
ib which has progressed from priors.  There is adjacent bony destruction at the level.  No evidence o
f new airspace disease, pleural effusion or pneumothorax occurred no new parenchymal nodules or annalise
s.  Mild upper lobe centrilobular emphysematous change.

HILAR AND MEDIASTINAL STRUCTURES: Stable shotty mediastinal nodes without new mediastinal, hilar or a
xillary adenopathy.

HEART AND VASCULAR STRUCTURES: Scattered coronary atherosclerosis.  Normal heart size.  No pericardia
l effusion.

HARDWARE: Left-sided chest port with the catheter tip at T8 cavoatrial junction.

UPPER ABDOMEN: See same-day abdomen CT for findings below the diaphragm.

THYROID AND OTHER SOFT TISSUES: Partially evaluated enlarged multinodular thyroid, similar to priors.


BONES: There has been increase in cortical destruction along a posterolateral right 7th rib adjacent 
to the lobular pleural thickening.  S increased from priors which previously-seen was.

OTHER: No other significant finding.



IMPRESSION:  Progression of an area of lobular pleural thickening along the right posterolateral remigio
thorax with adjacent bony destruction suspicious for pleural disease and osseous involvement.

Stable postsurgical changes within the right middle left upper lobes as above.

No evidence of acute intrathoracic process.



TECHNICAL DOCUMENTATION:  JOB ID:  [id number]

Quality ID # 436: Final reports with documentation of one or more dose reduction techniques (e.g., Au
tomated exposure control, adjustment of the mA and/or kV according to patient size, use of iterative 
reconstruction technique)

 2011 [employer]- All Rights Reserved



Reading location - IP[facility]

## 2019-04-01 NOTE — RADIOLOGY REPORT (SQ)
EXAM DESCRIPTION:  NM WHOLE BODY BONE SCAN



COMPLETED DATE/TIME:  4/1/2019 2:50 pm



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 
NEOPLASM OF UPPER LOBE, RIGHT BRONCHUS OR L



COMPARISON:  CT chest abdomen and pelvis 3/19/2019, 1/20/2018

PET-CT 9/30/2016, 1/28/2018, 6/10/2018

Whole-body bone scan 5/21/2015



RADIONUCLIDE AND DOSE:  20.6 millicuries Tc99m MDP.

The route of agent administration: Intravenous.



ADDITIONAL DRUGS AND DOSES:  None.



TECHNIQUE:  Routine delayed images at 3 hour post radionuclide injection acquired of the bony skeleto
n including anterior and posterior whole-body projections and additional focused images as needed.



LIMITATIONS:  None.



FINDINGS:  BONES: There is minimal increased uptake along the posterior right 7th rib at the level of
 an old right posterior rib thoracotomy defect.

Remainder of the skeletal uptake is otherwise unremarkable.

KIDNEYS: Symmetric excretion without obstruction.

OTHER: No other significant finding.



IMPRESSION:  Minimal uptake along an old right posterior 7th rib thoracotomy defect.  Otherwise unrem
arkable study.



COMMENT:  Quality measure 147:  Current bone scan is compared with any available plain radiographs, p
rior bone scans, and CT/MRI.



TECHNICAL DOCUMENTATION:  JOB ID:  [id number]

 2011 [employer]- All Rights Reserved



Reading location - IP/workstation name: [email or web address]

## 2019-06-20 NOTE — RADIOLOGY REPORT (SQ)
EXAM DESCRIPTION:  CT CHEST WITH; CT ABD/PELVIS WITH IV ONLY



COMPLETED DATE/TIME:  6/20/2019 10:44 am



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



COMPARISON:  Bone scan 6/20/2019

CT chest abdomen pelvis 3/19/2019, 1/21/2019, 10/29/2018, 8/6/2018



CONTRAST TYPE AND DOSE:  contrast/concentration: Isovue 350.00 mg/ml; Total Contrast Delivered: 100.0
 ml; Total Saline Delivered: 72.0 ml



RENAL FUNCTION:  Creatinine 0.9



TECHNIQUE:  CT scan of the chest performed using helical scanning technique with dynamic intravenous 
contrast injection.  Images reviewed with lung, soft tissue and bone windows. Reconstructed coronal a
nd sagittal MPR images reviewed.  All images stored on PACS.

CT scan of the abdomen and pelvis performed with intravenous and without oral contrastusing helical s
tu technique with dynamic intravenous contrast injection.  Images reviewed with lung, soft tissu
e and bone windows.  Reconstructed coronal and sagittal MPR images reviewed.  Delayed images for eval
uation of the urinary system also acquired and evaluated. All images stored on PACS.

All CT scanners at this facility use dose modulation, iterative reconstruction, and/or weight based d
osing when appropriate to reduce radiation dose to as low as reasonably achievable (ALARA).

[facility]: Dose Right  [facility]: CareDose    [facility]: Dose Right    [facility]: Teradose 4D    [facility]: [employer]



RADIATION DOSE:  CT Rad equipment meets quality standard of care and radiation dose reduction techniq
ues were employed. CTDIvol: 10.0 - 11.9 mGy. DLP: 2302 mGy-cm. .



LIMITATIONS:  None.



FINDINGS:  CHEST:

LUNGS AND PLEURA: Bandlike scarring is present along the posterior left upper lobe staple line, 2.3 x
 1 cm in size.  This is stable over multiple previous exams.

Patient is post right middle lobe resection with surgical staples at the right hilum.

Upper lobes are hyperlucent from obstructive disease.  No new worrisome pulmonary nodules.  No pleura
l effusion or pneumothorax.  No acute infiltrates.

HILAR AND MEDIASTINAL STRUCTURES: No identified masses or abnormal nodes.

HEART AND VASCULAR STRUCTURES: No aortic dissection.  Stable ectasia ascending thoracic aorta 4 cm in
 diameter.  No central pulmonary emboli.  No pericardial effusion.  Calcified coronary arteries

HARDWARE: Left-sided permanent central line tip superior vena cava

THYROID AND OTHER SOFT TISSUES: Stable thyromegaly.  Small hiatal hernia

BONES: Old right rib post thoracotomy changes

OTHER: No other significant finding.

ABDOMEN AND PELVIS:

LIVER: Normal size. No masses.  No dilated ducts.

SPLEEN: Normal size. No focal lesions.

PANCREAS: No masses. No significant calcifications. No adjacent inflammation or peripancreatic fluid 
collections. Pancreatic duct not dilated.

GALLBLADDER: Gallstones. No inflammatory changes to suggest cholecystitis.

ADRENAL GLANDS: No significant masses or asymmetry.

RIGHT KIDNEY AND URETER: No solid masses.  12 mm right midpole renal cortical cyst.  No significant c
alcification. No hydronephrosis or hydroureter.

LEFT KIDNEY AND URETER: No solid masses.  12 mm left midpole renal cortical cyst.  No significant felipe
cification. No hydronephrosis or hydroureter.

AORTA AND VESSELS: Infrarenal abdominal aorta normal caliber.  3 cm right proximal common iliac arter
y aneurysm axial image 56

RETROPERITONEUM: No retroperitoneal adenopathy, hemorrhage or masses.

BOWEL AND PERITONEAL CAVITY: No masses or inflammatory changes. No free fluid or peritoneal masses.  
No CT evidence of bowel obstruction.

APPENDIX: Normal.

ABDOMINAL WALL: Intact right inguinal hernia repair

PELVIS: No mass or free fluid.  Normal bladder.

BONES: No significant or acute findings.

OTHER: No other significant finding.



IMPRESSION:  Stable post therapeutic changes in the chest

Stable ectasia ascending thoracic aorta, stable 3 cm right common iliac artery aneurysm.

NORMAL CT OF THE ABDOMEN AND PELVIS WITH ORAL AND INTRAVENOUS CONTRAST.



TECHNICAL DOCUMENTATION:  JOB ID:  [id number]

Quality ID # 436: Final reports with documentation of one or more dose reduction techniques (e.g., Au
tomated exposure control, adjustment of the mA and/or kV according to patient size, use of iterative 
reconstruction technique)

 2011 [employer]- All Rights Reserved



Reading location - IP/workstation name: [email or web address]

## 2019-06-20 NOTE — RADIOLOGY REPORT (SQ)
EXAM DESCRIPTION:  CT CHEST WITH; CT ABD/PELVIS WITH IV ONLY



COMPLETED DATE/TIME:  6/20/2019 10:44 am



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



COMPARISON:  Bone scan 6/20/2019

CT chest abdomen pelvis 3/19/2019, 1/21/2019, 10/29/2018, 8/6/2018



CONTRAST TYPE AND DOSE:  contrast/concentration: Isovue 350.00 mg/ml; Total Contrast Delivered: 100.0
 ml; Total Saline Delivered: 72.0 ml



RENAL FUNCTION:  Creatinine 0.9



TECHNIQUE:  CT scan of the chest performed using helical scanning technique with dynamic intravenous 
contrast injection.  Images reviewed with lung, soft tissue and bone windows. Reconstructed coronal a
nd sagittal MPR images reviewed.  All images stored on PACS.

CT scan of the abdomen and pelvis performed with intravenous and without oral contrastusing helical s
tu technique with dynamic intravenous contrast injection.  Images reviewed with lung, soft tissu
e and bone windows.  Reconstructed coronal and sagittal MPR images reviewed.  Delayed images for eval
uation of the urinary system also acquired and evaluated. All images stored on PACS.

All CT scanners at this facility use dose modulation, iterative reconstruction, and/or weight based d
osing when appropriate to reduce radiation dose to as low as reasonably achievable (ALARA).

[facility]: Dose Right  [facility]: CareDose    [facility]: Dose Right    [facility]: Teradose 4D    [facility]: [employer]



RADIATION DOSE:  CT Rad equipment meets quality standard of care and radiation dose reduction techniq
ues were employed. CTDIvol: 10.0 - 11.9 mGy. DLP: 2302 mGy-cm. .



LIMITATIONS:  None.



FINDINGS:  CHEST:

LUNGS AND PLEURA: Bandlike scarring is present along the posterior left upper lobe staple line, 2.3 x
 1 cm in size.  This is stable over multiple previous exams.

Patient is post right middle lobe resection with surgical staples at the right hilum.

Upper lobes are hyperlucent from obstructive disease.  No new worrisome pulmonary nodules.  No pleura
l effusion or pneumothorax.  No acute infiltrates.

HILAR AND MEDIASTINAL STRUCTURES: No identified masses or abnormal nodes.

HEART AND VASCULAR STRUCTURES: No aortic dissection.  Stable ectasia ascending thoracic aorta 4 cm in
 diameter.  No central pulmonary emboli.  No pericardial effusion.  Calcified coronary arteries

HARDWARE: Left-sided permanent central line tip superior vena cava

THYROID AND OTHER SOFT TISSUES: Stable thyromegaly.  Small hiatal hernia

BONES: Old right rib post thoracotomy changes

OTHER: No other significant finding.

ABDOMEN AND PELVIS:

LIVER: Normal size. No masses.  No dilated ducts.

SPLEEN: Normal size. No focal lesions.

PANCREAS: No masses. No significant calcifications. No adjacent inflammation or peripancreatic fluid 
collections. Pancreatic duct not dilated.

GALLBLADDER: Gallstones. No inflammatory changes to suggest cholecystitis.

ADRENAL GLANDS: No significant masses or asymmetry.

RIGHT KIDNEY AND URETER: No solid masses.  12 mm right midpole renal cortical cyst.  No significant c
alcification. No hydronephrosis or hydroureter.

LEFT KIDNEY AND URETER: No solid masses.  12 mm left midpole renal cortical cyst.  No significant felipe
cification. No hydronephrosis or hydroureter.

AORTA AND VESSELS: Infrarenal abdominal aorta normal caliber.  3 cm right proximal common iliac arter
y aneurysm axial image 56

RETROPERITONEUM: No retroperitoneal adenopathy, hemorrhage or masses.

BOWEL AND PERITONEAL CAVITY: No masses or inflammatory changes. No free fluid or peritoneal masses.  
No CT evidence of bowel obstruction.

APPENDIX: Normal.

ABDOMINAL WALL: Intact right inguinal hernia repair

PELVIS: No mass or free fluid.  Normal bladder.

BONES: No significant or acute findings.

OTHER: No other significant finding.



IMPRESSION:  Stable post therapeutic changes in the chest

Stable ectasia ascending thoracic aorta, stable 3 cm right common iliac artery aneurysm.

NORMAL CT OF THE ABDOMEN AND PELVIS WITH ORAL AND INTRAVENOUS CONTRAST.



TECHNICAL DOCUMENTATION:  JOB ID:  [id number]

Quality ID # 436: Final reports with documentation of one or more dose reduction techniques (e.g., Au
tomated exposure control, adjustment of the mA and/or kV according to patient size, use of iterative 
reconstruction technique)

 2011 [employer]- All Rights Reserved



Reading location - IP/workstation name: [email or web address]

## 2019-09-17 NOTE — RADIOLOGY REPORT (SQ)
EXAM DESCRIPTION:  CT CHEST WITH; CT ABD/PELVIS WITH IV ONLY



COMPLETED DATE/TIME:  9/17/2019 8:05 am



REASON FOR STUDY:  LUNG CANCER, BONE METS C34.11  MALIGNANT NEOPLASM OF UPPER LOBE, RIGHT BRONCHUS OR
 L C79.51  SECONDARY MALIGNANT NEOPLASM OF BONE



CONTRAST TYPE AND DOSE:  contrast/concentration: Isovue 350.00 mg/ml; Total Contrast Delivered: 100.0
 ml; Total Saline Delivered: 72.0 ml



RENAL FUNCTION:  Creatinine 1.0



COMPARISON:  6/20/2019



TECHNIQUE:  CT scan of the chest performed using helical scanning technique with dynamic intravenous 
contrast injection.  Images reviewed with lung, soft tissue and bone windows. Reconstructed coronal a
nd sagittal MPR images reviewed.  All images stored on PACS.

All CT scanners at this facility use dose modulation, iterative reconstruction, and/or weight based d
osing when appropriate to reduce radiation dose to as low as reasonably achievable (ALARA).

[facility]: Dose Right  [facility]: CareDose    [facility]: Dose Right    [facility]: Teradose 4D    [facility]: Smart [employer]



RADIATION DOSE:  CT Rad equipment meets quality standard of care and radiation dose reduction techniq
ues were employed. CTDIvol: 11.9 - 13.4 mGy. DLP: 1917 mGy-cm. .



LIMITATIONS:  None.



FINDINGS:  AXILLAE: No adenopathy.

CHEST WALL: No masses.  No subcutaneous air.

LUNGS: Overall there has been improvement since prior study.  The largest nodule along the fissure in
 the left upper lobe is measured at 9.5 x 19.4 mm previously this measured 11.0 mm x 23.4 mm.  Other 
parenchymal opacities in the right upper lobe and right middle lobe are grossly stable.  No new nodul
es.  No focal consolidation.

PLEURA: No effusions.  No calcifications.

THYROID: The thyroid lobes are enlarged bilaterally with multiple nodules.

HILAR AND MEDIASTINAL STRUCTURES: There are small scattered mediastinal nodes.

AORTA AND GREAT VESSELS: Stable in appearance.  Ascending aorta measures up to 4.1 mm in greatest meera
meter.

PULMONARY ARTERIES: No identified pulmonary emboli.  Study not optimized for the pulmonary arteries.

HEART: No pericardial effusion.

HARDWARE AND LIFELINES: None.

BONES: No significant finding.

OTHER: No other significant finding.



IMPRESSION:  Largest nodule in the left upper lobe along the fissure has decreased in size as describ
ed.  No new findings.



COMPARISON:  None.



RADIATION DOSE:  CT Rad equipment meets quality standard of care and radiation dose reduction techniq
ues were employed. CTDIvol: 11.9 - 13.4 mGy. DLP: 1917 mGy-cm. mGy.



TECHNIQUE:  CT scan of the abdomen and pelvis performed with intravenous and oral contrast using carolee
felipe scanning technique with dynamic intravenous contrast injection.  Images reviewed with lung, soft 
tissue and bone windows.  Reconstructed coronal and sagittal MPR images reviewed.  Delayed images for
 evaluation of the urinary system also acquired and evaluated. All images stored on PACS.

All CT scanners at this facility use dose modulation, iterative reconstruction, and/or weight based d
osing when appropriate to reduce radiation dose to as low as reasonably achievable (ALARA).

[facility]: Dose Right  [facility]: SureCare    [facility]: Dose Right    [facility]: Teradose 4D    [facility]: Smart Technologies



FINDINGS:  LIVER: Normal size. No masses.  No dilated ducts.

SPLEEN: Normal size.  No focal lesions.

PANCREAS: No masses.  No significant calcifications.  No adjacent inflammation or peripancreatic flui
d collections.  Pancreatic duct not dilated.

GALLBLADDER: Small layering stones.

ADRENAL GLANDS: No significant masses or asymmetry.

RIGHT KIDNEY AND URETER: Small cortical cysts.   No significant calcifications.   No hydronephrosis o
r hydroureter.

LEFT KIDNEY AND URETER: Small cortical cysts.   No significant calcifications.   No hydronephrosis or
 hydroureter.

AORTA AND VESSELS: Aorta demonstrates atherosclerotic change.  No aneurysm or dissection.  There is a
 3.3 cm right common iliac artery aneurysm.  There is mural thrombus the located anteriorly.  This is
 unchanged from prior study.

RETROPERITONEUM: No retroperitoneal adenopathy, hemorrhage or masses.

LARGE AND SMALL BOWEL: No dilatation.  No masses.  No wall thickening.

APPENDIX: Normal.

ABDOMINAL WALL: No hernia or masses.

PERITONEAL CAVITY: No free air.  No free fluid.  No peritoneal implants or masses.

PELVIS: No mass or free fluid.  Normal bladder.

BONES: No significant or acute findings.

OTHER: No other significant finding.



IMPRESSION:  Stable 3.3 cm right common iliac artery aneurysm.  No evidence of metastatic disease in 
the abdomen or pelvis.



TECHNICAL DOCUMENTATION:  JOB ID:  [id number]

Quality ID # 436: Final reports with documentation of one or more dose reduction techniques (e.g., Au
tomated exposure control, adjustment of the mA and/or kV according to patient size, use of iterative 
reconstruction technique)

 2011 [employer]- All Rights Reserved



Reading location - IP/workstation name: [email or web address]

## 2019-09-17 NOTE — RADIOLOGY REPORT (SQ)
EXAM DESCRIPTION:  NM WHOLE BODY BONE SCAN



COMPLETED DATE/TIME:  9/17/2019 12:52 pm



REASON FOR STUDY:  LUNG CANCER, BONE METS C34.11  MALIGNANT NEOPLASM OF UPPER LOBE, RIGHT BRONCHUS OR
 L C79.51  SECONDARY MALIGNANT NEOPLASM OF BONE



COMPARISON:  CT chest abdomen and pelvis done earlier the same day, prior bone scan dated 4/1/2019



RADIONUCLIDE AND DOSE:  21.5 millicuries Tc99m HDP.

The route of agent administration: Intravenous.



ADDITIONAL DRUGS AND DOSES:  None.



TECHNIQUE:  Routine delayed images at 3 hour post radionuclide injection acquired of the bony skeleto
n including anterior and posterior whole-body projections and additional focused images as needed.



LIMITATIONS:  None.



FINDINGS:  BONES: Persistent uptake in a right anterolateral rib consistent with thoracotomy defect. 
 No evidence of metastatic disease.

KIDNEYS: Symmetric excretion without obstruction.

OTHER: No other significant finding.



IMPRESSION:  Stable whole-body bone scan.  Postsurgical changes in the right chest wall.  No evidence
 of metastatic disease.



COMMENT:  Quality measure 147:  Current bone scan is compared with any available plain radiographs, p
rior bone scans, and CT/MRI.



TECHNICAL DOCUMENTATION:  JOB ID:  [id number]

 2011 [employer]- All Rights Reserved



Reading location - IP/workstation name: [email or web address]

## 2019-09-17 NOTE — RADIOLOGY REPORT (SQ)
EXAM DESCRIPTION:  CT CHEST WITH; CT ABD/PELVIS WITH IV ONLY



COMPLETED DATE/TIME:  9/17/2019 8:05 am



REASON FOR STUDY:  LUNG CANCER, BONE METS C34.11  MALIGNANT NEOPLASM OF UPPER LOBE, RIGHT BRONCHUS OR
 L C79.51  SECONDARY MALIGNANT NEOPLASM OF BONE



CONTRAST TYPE AND DOSE:  contrast/concentration: Isovue 350.00 mg/ml; Total Contrast Delivered: 100.0
 ml; Total Saline Delivered: 72.0 ml



RENAL FUNCTION:  Creatinine 1.0



COMPARISON:  6/20/2019



TECHNIQUE:  CT scan of the chest performed using helical scanning technique with dynamic intravenous 
contrast injection.  Images reviewed with lung, soft tissue and bone windows. Reconstructed coronal a
nd sagittal MPR images reviewed.  All images stored on PACS.

All CT scanners at this facility use dose modulation, iterative reconstruction, and/or weight based d
osing when appropriate to reduce radiation dose to as low as reasonably achievable (ALARA).

[facility]: Dose Right  [facility]: CareDose    [facility]: Dose Right    [facility]: Teradose 4D    [facility]: Smart [employer]



RADIATION DOSE:  CT Rad equipment meets quality standard of care and radiation dose reduction techniq
ues were employed. CTDIvol: 11.9 - 13.4 mGy. DLP: 1917 mGy-cm. .



LIMITATIONS:  None.



FINDINGS:  AXILLAE: No adenopathy.

CHEST WALL: No masses.  No subcutaneous air.

LUNGS: Overall there has been improvement since prior study.  The largest nodule along the fissure in
 the left upper lobe is measured at 9.5 x 19.4 mm previously this measured 11.0 mm x 23.4 mm.  Other 
parenchymal opacities in the right upper lobe and right middle lobe are grossly stable.  No new nodul
es.  No focal consolidation.

PLEURA: No effusions.  No calcifications.

THYROID: The thyroid lobes are enlarged bilaterally with multiple nodules.

HILAR AND MEDIASTINAL STRUCTURES: There are small scattered mediastinal nodes.

AORTA AND GREAT VESSELS: Stable in appearance.  Ascending aorta measures up to 4.1 mm in greatest meera
meter.

PULMONARY ARTERIES: No identified pulmonary emboli.  Study not optimized for the pulmonary arteries.

HEART: No pericardial effusion.

HARDWARE AND LIFELINES: None.

BONES: No significant finding.

OTHER: No other significant finding.



IMPRESSION:  Largest nodule in the left upper lobe along the fissure has decreased in size as describ
ed.  No new findings.



COMPARISON:  None.



RADIATION DOSE:  CT Rad equipment meets quality standard of care and radiation dose reduction techniq
ues were employed. CTDIvol: 11.9 - 13.4 mGy. DLP: 1917 mGy-cm. mGy.



TECHNIQUE:  CT scan of the abdomen and pelvis performed with intravenous and oral contrast using carolee
felipe scanning technique with dynamic intravenous contrast injection.  Images reviewed with lung, soft 
tissue and bone windows.  Reconstructed coronal and sagittal MPR images reviewed.  Delayed images for
 evaluation of the urinary system also acquired and evaluated. All images stored on PACS.

All CT scanners at this facility use dose modulation, iterative reconstruction, and/or weight based d
osing when appropriate to reduce radiation dose to as low as reasonably achievable (ALARA).

[facility]: Dose Right  [facility]: SureCare    [facility]: Dose Right    [facility]: Teradose 4D    [facility]: Smart [employer]



FINDINGS:  LIVER: Normal size. No masses.  No dilated ducts.

SPLEEN: Normal size.  No focal lesions.

PANCREAS: No masses.  No significant calcifications.  No adjacent inflammation or peripancreatic flui
d collections.  Pancreatic duct not dilated.

GALLBLADDER: Small layering stones.

ADRENAL GLANDS: No significant masses or asymmetry.

RIGHT KIDNEY AND URETER: Small cortical cysts.   No significant calcifications.   No hydronephrosis o
r hydroureter.

LEFT KIDNEY AND URETER: Small cortical cysts.   No significant calcifications.   No hydronephrosis or
 hydroureter.

AORTA AND VESSELS: Aorta demonstrates atherosclerotic change.  No aneurysm or dissection.  There is a
 3.3 cm right common iliac artery aneurysm.  There is mural thrombus the located anteriorly.  This is
 unchanged from prior study.

RETROPERITONEUM: No retroperitoneal adenopathy, hemorrhage or masses.

LARGE AND SMALL BOWEL: No dilatation.  No masses.  No wall thickening.

APPENDIX: Normal.

ABDOMINAL WALL: No hernia or masses.

PERITONEAL CAVITY: No free air.  No free fluid.  No peritoneal implants or masses.

PELVIS: No mass or free fluid.  Normal bladder.

BONES: No significant or acute findings.

OTHER: No other significant finding.



IMPRESSION:  Stable 3.3 cm right common iliac artery aneurysm.  No evidence of metastatic disease in 
the abdomen or pelvis.



TECHNICAL DOCUMENTATION:  JOB ID:  [id number]

Quality ID # 436: Final reports with documentation of one or more dose reduction techniques (e.g., Au
tomated exposure control, adjustment of the mA and/or kV according to patient size, use of iterative 
reconstruction technique)

 2011 [employer]- All Rights Reserved



Reading location - IP/workstation name: [email or web address]

## 2019-10-04 NOTE — OPERATIVE REPORT
Operative Report-[facility]


Operative Report: 


DATE OF SURGERY: [10/3/2019]


PREOPERATIVE DIAGNOSIS: Cataracts, left eye


POSTOPERATIVE DIAGNOSIS: Cataract, left eye OPERATION: Cataract extraction with 

insertion of an IOL of the left eye.


Intraocular Lens Model: [17.0 sn60wf] reason for surgery is difficulty seeing 

road signs


SURGEON: Surinder Mendiola MD


ANESTHESIA: Topical


PROCEDURE: After obtaining appropriate consent, the patient's left eye was 

prepped and draped in a sterile fashion as well as the surgeon in the sterile 

manner and cataract surgery was started. First a paracentesis blade was used to 

make a side-port incision. Viscoelastic was used to inflate the anterior 

chamber. Next a 2.4 mm incision was made with a 2.4 mm blade, clear corneal 

temporarily. A continuous capsulorrhexis was made using a cystotome and Utrata 

forceps. Following this hydrodissection was carried out to make commands fully 

loose and mobile and it was rotated 90 degrees. Following this, a divide and 

conquer technique was used to phacoemulsify the lens. The remaining cortex was 

removed with an irrigation/aspiration. Provisc was instilled into the capsular 

bag to inflate the bag.The intracular lens was placed. The remaining 

viscoelastic material was removed with irrigation/aspiration. Following this, 

the incision was found to be watertight. Besivance and Cosopt was instilled into

the eye and a protective shield was placed over the eye. The patient was turned 

to the postoperative recovery in a stable condition.

## 2019-12-11 NOTE — RADIOLOGY REPORT (SQ)
EXAM DESCRIPTION:  CT CHEST WITH



COMPLETED DATE/TIME:  12/11/2019 8:19 am



REASON FOR STUDY:  LUNG CANCER C34.11  MALIGNANT NEOPLASM OF UPPER LOBE, RIGHT BRONCHUS OR L



COMPARISON:  9/17/2019



TECHNIQUE:  CT scan of the chest performed using helical scanning technique with dynamic intravenous 
contrast injection.  Images reviewed with lung, soft tissue and bone windows.  Reconstructed coronal 
and sagittal MPR and MIP images reviewed.  All images stored on PACS.

All CT scanners at this facility use dose modulation, iterative reconstruction, and/or weight based d
osing when appropriate to reduce radiation dose to as low as reasonably achievable (ALARA).

[facility]: Dose Right  [facility]: CareDose    [facility]: Dose Right    [facility]: Teradose 4D    [facility]: Smart [employer]



CONTRAST TYPE AND DOSE:  Omnipaque 350 100 cc



RENAL FUNCTION:  Creatinine 1.1



RADIATION DOSE:  CT Rad equipment meets quality standard of care and radiation dose reduction techniq
ues were employed. CTDIvol: 12.4 - 13.3 mGy. DLP: 1873 mGy-cm. .



LIMITATIONS:  None.



FINDINGS:  LUNGS AND PLEURA: Stable surgical change and nodular opacity within the left upper lobe al
anne marie the major fissure measuring approximately 20 x 10 mm.  No new discrete nodules or masses.  No ple
ural effusion or pneumothorax.

HILAR AND MEDIASTINAL STRUCTURES: Stable precarinal node measuring 11 mm in short axis (series 6, son
ge 40) no new discrete mediastinal, hilar or axillary adenopathy.

HEART AND VASCULAR STRUCTURES: No aneurysm or dissection.  No central pulmonary embolus.  Scattered c
oronary atherosclerosis.  No significant pericardial effusion.

HARDWARE: Left-sided chest port with catheter tip at the cavoatrial junction.

UPPER ABDOMEN: See separate report of the CT of the abdomen.

THYROID AND OTHER SOFT TISSUES: Enlarged heterogeneous thyroid with a a 2 cm left lower pole nodule, 
stable.

BONES: No acute bony abnormality.  Unchanged chronic fracture and lytic changes within the right 7th 
rib.  No new lytic or blastic osseous lesion.

OTHER: No other significant finding.



IMPRESSION:  1.  Stable left upper lobe nodular opacity.  No new discrete nodules or masses.

2.  No evidence of acute intrathoracic disease.

3.  Stable heterogeneous thyroid and 2 cm left lower pole nodule.

4. Please see same-day abdomen CT for findings below the diaphragm.



TECHNICAL DOCUMENTATION:  JOB ID:  [id number]

Quality ID # 436: Final reports with documentation of one or more dose reduction techniques (e.g., Au
tomated exposure control, adjustment of the mA and/or kV according to patient size, use of iterative 
reconstruction technique)

 2011 [employer]- All Rights Reserved



Reading location - IP/workstation name: [email or web address]

## 2019-12-11 NOTE — RADIOLOGY REPORT (SQ)
EXAM DESCRIPTION:  CT ABD/PELVIS WITH IV ONLY



COMPLETED DATE/TIME:  12/11/2019 8:20 am



REASON FOR STUDY:  LUNG CANCER C34.11  MALIGNANT NEOPLASM OF UPPER LOBE, RIGHT BRONCHUS OR L



COMPARISON:  9/17/2019



TECHNIQUE:  CT scan of the abdomen and pelvis performed using helical scanning technique with dynamic
 intravenous contrast injection.  No oral contrast. Images reviewed with lung, soft tissue, and bone 
windows. Reconstructed coronal and sagittal MPR images reviewed. Delayed images for evaluation of the
 urinary system also acquired. All images stored on PACS.

All CT scanners at this facility use dose modulation, iterative reconstruction, and/or weight based d
osing when appropriate to reduce radiation dose to as low as reasonably achievable (ALARA).

[facility]: Dose Right  [facility]: CareDose    [facility]: Dose Right    [facility]: Teradose 4D    [facility]: Smart [employer]



CONTRAST TYPE AND DOSE:  contrast/concentration: Isovue 350.00 mg/ml; Total Contrast Delivered: 100.0
 ml; Total Saline Delivered: 72.0 ml



RENAL FUNCTION:  See chest



RADIATION DOSE:  .



LIMITATIONS:  None.



FINDINGS:  LOWER CHEST: See separate report of the CT of the chest.

LIVER: Normal size. No masses.  No dilated ducts.

SPLEEN: Normal size. No focal lesions.

PANCREAS: No masses. No significant calcifications. No adjacent inflammation or peripancreatic fluid 
collections. Pancreatic duct not dilated.

GALLBLADDER: Gallstones. No inflammatory changes to suggest cholecystitis.

ADRENAL GLANDS: No significant masses or asymmetry.

RIGHT KIDNEY AND URETER: No solid masses.  Stable cyst.  No significant calcifications.   No hydronep
hrosis or hydroureter.

LEFT KIDNEY AND URETER: No solid masses.  Stable exophytic cyst.  No significant calcifications.   No
 hydronephrosis or hydroureter.

AORTA AND VESSELS: Aortoiliac atherosclerosis.  Unchanged aneurysmal dilation and mural thrombus at t
he proximal right common iliac artery measuring up to 3.0 cm (coronal image 59).  Celiac, SMA, renals
 and GUY are well opacified.

RETROPERITONEUM: No retroperitoneal adenopathy, hemorrhage or masses.

BOWEL AND PERITONEAL CAVITY: No masses or inflammatory changes. No free fluid or peritoneal masses.

APPENDIX: Normal.

PELVIS: Unremarkable urinary bladder.  Status post prostatectomy.  No free fluid or adenopathy.

ABDOMINAL WALL: No masses.  Evidence of right inguinal hernia repair.

BONES: No acute bony abnormality.  No discrete lytic or blastic osseous lesions.  Lower lumbar facet 
arthropathy.

OTHER: No other significant finding.



IMPRESSION:  1.  No evidence of metastatic disease within the abdomen or pelvis.

2.  Stable right common iliac artery aneurysm measuring up to 3.0 cm.



TECHNICAL DOCUMENTATION:  JOB ID:  [id number]

Quality ID # 436: Final reports with documentation of one or more dose reduction techniques (e.g., Au
tomated exposure control, adjustment of the mA and/or kV according to patient size, use of iterative 
reconstruction technique)

 2011 [employer]- All Rights Reserved



Reading location - IP/workstation name: [email or web address]

## 2019-12-11 NOTE — RADIOLOGY REPORT (SQ)
EXAM DESCRIPTION:  NM WHOLE BODY BONE SCAN



COMPLETED DATE/TIME:  12/11/2019 11:53 am



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



COMPARISON:  PET-CT 6/10/2018, 1/28/2018

Whole-body bone scan 4/1/2019, 9/17/2019

CT chest abdomen pelvis 12/11/2019



RADIONUCLIDE AND DOSE:  22 millicuries Tc99m MDP.

The route of agent administration: Intravenous.



ADDITIONAL DRUGS AND DOSES:  None.



TECHNIQUE:  Routine delayed images at 3 hours post radionuclide injection acquired of the bony skelet
on including anterior and posterior whole-body projections and additional focused images as needed.



LIMITATIONS:  None.



FINDINGS:  BONES: Normal visualization without areas of photopenia or increased bony uptake of radiop
harmaceutical.

KIDNEYS: Symmetric excretion without obstruction.

OTHER: No other significant finding.



IMPRESSION:  NORMAL BONE SCAN.



COMMENT:  Quality measure 147:  Current bone scan is compared with any available plain radiographs, p
rior bone scans, and CT/MRI.



TECHNICAL DOCUMENTATION:  JOB ID:  [id number]

 2011 [employer]- All Rights Reserved



Reading location - IP/workstation name: [email or web address]

## 2020-03-10 NOTE — RADIOLOGY REPORT (SQ)
EXAM DESCRIPTION:  CT CHEST WITH; CT ABD/PELVIS WITH IV ONLY



COMPLETED DATE/TIME:  3/10/2020 8:26 am; 3/10/2020 8:28 am



REASON FOR STUDY:  LUNG CA (C34.11) C34.11  MALIGNANT NEOPLASM OF UPPER LOBE, RIGHT BRONCHUS OR L



CONTRAST TYPE AND DOSE:  contrast/concentration: Isovue 350.00 mg/ml; Total Contrast Delivered: 100.0
 ml; Total Saline Delivered: 72.0 ml



RENAL FUNCTION:  BUN 10 creatinine 0.8



COMPARISON:  12/11/2019



TECHNIQUE:  CT scan of the chest performed using helical scanning technique with dynamic intravenous 
contrast injection.  Images reviewed with lung, soft tissue and bone windows. Reconstructed coronal a
nd sagittal MPR images reviewed.  All images stored on PACS.

All CT scanners at this facility use dose modulation, iterative reconstruction, and/or weight based d
osing when appropriate to reduce radiation dose to as low as reasonably achievable (ALARA).

[facility]: Dose Right  [facility]: CareDose    [facility]: Dose Right    [facility]: Teradose 4D    [facility]: Smart Technologies



RADIATION DOSE:  CT Rad equipment meets quality standard of care and radiation dose reduction techniq
ues were employed. CTDIvol: 12.6 - 14.2 mGy. DLP: 2072 mGy-cm. .



LIMITATIONS:  None.



FINDINGS:  AXILLAE: No adenopathy.

CHEST WALL: No masses.  No subcutaneous air.

LUNGS: There is a 12.9 x 8.8 mm nodule in the left upper lobe on image 42.  This is more prominent.  
Centrilobular emphysematous changes are present.  Pleural/parenchymal scarring is seen in the right l
adam.

PLEURA: No effusions.  Pleural/ parenchymal scarring.

THYROID: The thyroid is quite heterogeneous and enlarged.  There is a large low-density lesion in the
 left lobe of the gland.

HILAR AND MEDIASTINAL STRUCTURES: No identified masses or abnormal nodes.

AORTA AND GREAT VESSELS: No aneurysm.  No dissection.

PULMONARY ARTERIES: No identified pulmonary emboli.  Study not optimized for the pulmonary arteries.

HEART: No pericardial effusion.

HARDWARE AND LIFELINES: None.

BONES: No significant finding.

OTHER: No other significant finding.



IMPRESSION:  Left upper lobe pulmonary nodule appears more prominent.  Pleural/parenchymal changes on
 the right that are new.



COMPARISON:  None.



RADIATION DOSE:  CT Rad equipment meets quality standard of care and radiation dose reduction techniq
ues were employed. CTDIvol: 12.6 - 14.2 mGy. DLP: 2072 mGy-cm. mGy.



TECHNIQUE:  CT scan of the abdomen and pelvis performed with intravenous and oral contrast using carolee
felipe scanning technique with dynamic intravenous contrast injection.  Images reviewed with lung, soft 
tissue and bone windows.  Reconstructed coronal and sagittal MPR images reviewed.  Delayed images for
 evaluation of the urinary system also acquired and evaluated. All images stored on PACS.

All CT scanners at this facility use dose modulation, iterative reconstruction, and/or weight based d
osing when appropriate to reduce radiation dose to as low as reasonably achievable (ALARA).

[facility]: Dose Right  [facility]: SureCare    [facility]: Dose Right    [facility]: Teradose 4D    [facility]: Smart [employer]



FINDINGS:  LIVER: Normal size. No masses.  No dilated ducts.

SPLEEN: Normal size.  No focal lesions.

PANCREAS: No masses.  No significant calcifications.  No adjacent inflammation or peripancreatic flui
d collections.  Pancreatic duct not dilated.

GALLBLADDER: No identified stones by CT criteria. No inflammatory changes to suggest cholecystitis.

ADRENAL GLANDS: No significant masses or asymmetry.

RIGHT KIDNEY AND URETER: No solid masses.   No significant calcifications.   No hydronephrosis or hyd
roureter.

LEFT KIDNEY AND URETER: No solid masses.   No significant calcifications.   No hydronephrosis or hydr
oureter.

AORTA AND VESSELS: No aortic aneurysm.  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
roseann.

RETROPERITONEUM: No retroperitoneal adenopathy, hemorrhage or masses.

LARGE AND SMALL BOWEL: No dilatation.  No masses.  No wall thickening.

APPENDIX: Normal.

ABDOMINAL WALL: No hernia or masses.

PERITONEAL CAVITY: No free air.  No free fluid.  No peritoneal implants or masses.

PELVIS: No mass or free fluid.  Normal bladder.

BONES: No significant or acute findings.

OTHER: No other significant finding.



IMPRESSION:  There is no evidence of metastatic disease in the abdomen or pelvis.  Stable 3 cm aneury
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



TECHNICAL DOCUMENTATION:  JOB ID:  [id number]

Quality ID # 436: Final reports with documentation of one or more dose reduction techniques (e.g., Au
tomated exposure control, adjustment of the mA and/or kV according to patient size, use of iterative 
reconstruction technique)

 2011 [employer]- All Rights Reserved



Reading location - IP/workstation name: [email or web address]
EXAM DESCRIPTION:  CT CHEST WITH; CT ABD/PELVIS WITH IV ONLY



COMPLETED DATE/TIME:  3/10/2020 8:26 am; 3/10/2020 8:28 am



REASON FOR STUDY:  LUNG CA (C34.11) C34.11  MALIGNANT NEOPLASM OF UPPER LOBE, RIGHT BRONCHUS OR L



CONTRAST TYPE AND DOSE:  contrast/concentration: Isovue 350.00 mg/ml; Total Contrast Delivered: 100.0
 ml; Total Saline Delivered: 72.0 ml



RENAL FUNCTION:  BUN 10 creatinine 0.8



COMPARISON:  12/11/2019



TECHNIQUE:  CT scan of the chest performed using helical scanning technique with dynamic intravenous 
contrast injection.  Images reviewed with lung, soft tissue and bone windows. Reconstructed coronal a
nd sagittal MPR images reviewed.  All images stored on PACS.

All CT scanners at this facility use dose modulation, iterative reconstruction, and/or weight based d
osing when appropriate to reduce radiation dose to as low as reasonably achievable (ALARA).

[facility]: Dose Right  [facility]: CareDose    [facility]: Dose Right    [facility]: Teradose 4D    [facility]: [employer]



RADIATION DOSE:  CT Rad equipment meets quality standard of care and radiation dose reduction techniq
ues were employed. CTDIvol: 12.6 - 14.2 mGy. DLP: 2072 mGy-cm. .



LIMITATIONS:  None.



FINDINGS:  AXILLAE: No adenopathy.

CHEST WALL: No masses.  No subcutaneous air.

LUNGS: There is a 12.9 x 8.8 mm nodule in the left upper lobe on image 42.  This is more prominent.  
Centrilobular emphysematous changes are present.  Pleural/parenchymal scarring is seen in the right l
adam.

PLEURA: No effusions.  Pleural/ parenchymal scarring.

THYROID: The thyroid is quite heterogeneous and enlarged.  There is a large low-density lesion in the
 left lobe of the gland.

HILAR AND MEDIASTINAL STRUCTURES: No identified masses or abnormal nodes.

AORTA AND GREAT VESSELS: No aneurysm.  No dissection.

PULMONARY ARTERIES: No identified pulmonary emboli.  Study not optimized for the pulmonary arteries.

HEART: No pericardial effusion.

HARDWARE AND LIFELINES: None.

BONES: No significant finding.

OTHER: No other significant finding.



IMPRESSION:  Left upper lobe pulmonary nodule appears more prominent.  Pleural/parenchymal changes on
 the right that are new.



COMPARISON:  None.



RADIATION DOSE:  CT Rad equipment meets quality standard of care and radiation dose reduction techniq
ues were employed. CTDIvol: 12.6 - 14.2 mGy. DLP: 2072 mGy-cm. mGy.



TECHNIQUE:  CT scan of the abdomen and pelvis performed with intravenous and oral contrast using carolee
felipe scanning technique with dynamic intravenous contrast injection.  Images reviewed with lung, soft 
tissue and bone windows.  Reconstructed coronal and sagittal MPR images reviewed.  Delayed images for
 evaluation of the urinary system also acquired and evaluated. All images stored on PACS.

All CT scanners at this facility use dose modulation, iterative reconstruction, and/or weight based d
osing when appropriate to reduce radiation dose to as low as reasonably achievable (ALARA).

[facility]: Dose Right  [facility]: SureCare    [facility]: Dose Right    [facility]: Teradose 4D    [facility]: Smart [employer]



FINDINGS:  LIVER: Normal size. No masses.  No dilated ducts.

SPLEEN: Normal size.  No focal lesions.

PANCREAS: No masses.  No significant calcifications.  No adjacent inflammation or peripancreatic flui
d collections.  Pancreatic duct not dilated.

GALLBLADDER: No identified stones by CT criteria. No inflammatory changes to suggest cholecystitis.

ADRENAL GLANDS: No significant masses or asymmetry.

RIGHT KIDNEY AND URETER: No solid masses.   No significant calcifications.   No hydronephrosis or hyd
roureter.

LEFT KIDNEY AND URETER: No solid masses.   No significant calcifications.   No hydronephrosis or hydr
oureter.

AORTA AND VESSELS: No aortic aneurysm.  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
roseann.

RETROPERITONEUM: No retroperitoneal adenopathy, hemorrhage or masses.

LARGE AND SMALL BOWEL: No dilatation.  No masses.  No wall thickening.

APPENDIX: Normal.

ABDOMINAL WALL: No hernia or masses.

PERITONEAL CAVITY: No free air.  No free fluid.  No peritoneal implants or masses.

PELVIS: No mass or free fluid.  Normal bladder.

BONES: No significant or acute findings.

OTHER: No other significant finding.



IMPRESSION:  There is no evidence of metastatic disease in the abdomen or pelvis.  Stable 3 cm aneury
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



TECHNICAL DOCUMENTATION:  JOB ID:  [id number]

Quality ID # 436: Final reports with documentation of one or more dose reduction techniques (e.g., Au
tomated exposure control, adjustment of the mA and/or kV according to patient size, use of iterative 
reconstruction technique)

 2011 [employer]- All Rights Reserved



Reading location - IP/workstation name: [email or web address]
EXAM DESCRIPTION:  NM WHOLE BODY BONE SCAN



COMPLETED DATE/TIME:  3/10/2020 12:12 pm



REASON FOR STUDY:  LUNG CA (C34.11) C34.11  MALIGNANT NEOPLASM OF UPPER LOBE, RIGHT BRONCHUS OR L



COMPARISON:  12/11/2019



RADIONUCLIDE AND DOSE:  20 millicuries Tc99m HDP.

The route of agent administration: Intravenous.



ADDITIONAL DRUGS AND DOSES:  None.



TECHNIQUE:  Routine delayed images at 3 hours post radionuclide injection acquired of the bony skelet
on including anterior and posterior whole-body projections and additional focused images as needed.



LIMITATIONS:  None.



FINDINGS:  BONES: There is a small focus of activity in the right side of the calvarium.  There is in
creased uptake in the posterior right 7th rib.  There is increased activity in the mid left humerus.

KIDNEYS: Symmetric excretion without obstruction.

OTHER: No other significant finding.



IMPRESSION:  There are areas of increased activity in the right side of the calvarium and in the post
erior right 7th rib that are present on prior studies.  There is increased activity in the mid left h
umerus that is not included on the prior studies.  Cannot exclude metastatic disease to bone.



COMMENT:  Quality measure 147:  Current bone scan is compared with any available plain radiographs, p
rior bone scans, and CT/MRI.



TECHNICAL DOCUMENTATION:  JOB ID:  [id number]

 2011 [employer]- All Rights Reserved



Reading location - IP/workstation name: [email or web address]
[email or web address]

## 2020-05-28 NOTE — RADIOLOGY REPORT (SQ)
EXAM DESCRIPTION:  CT CHEST WITH; CT ABD/PELVIS WITH IV ONLY



IMAGES COMPLETED DATE/TIME:  5/28/2020 8:49 am



REASON FOR STUDY:  LUNG CANCER (C34.11) C34.11  MALIGNANT NEOPLASM OF UPPER LOBE, RIGHT BRONCHUS OR L




CONTRAST TYPE AND DOSE:  contrast/concentration: Isovue 350.00 mg/ml; Total Contrast Delivered: 100.0
 ml; Total Saline Delivered: 72.0 ml



RENAL FUNCTION:  Creatinine 1.1



COMPARISON:  3/10/2020



TECHNIQUE:  CT scan of the chest performed using helical scanning technique with dynamic intravenous 
contrast injection.  Images reviewed with lung, soft tissue and bone windows. Reconstructed coronal a
nd sagittal MPR images reviewed.  All images stored on PACS.

All CT scanners at this facility use dose modulation, iterative reconstruction, and/or weight based d
osing when appropriate to reduce radiation dose to as low as reasonably achievable (ALARA).

[facility]: Dose Right  [facility]: CareDose    [facility]: Dose Right    [facility]: Teradose 4D    [facility]: Smart [employer]



RADIATION DOSE:  CT Rad equipment meets quality standard of care and radiation dose reduction techniq
ues were employed. CTDIvol: 9.4 - 11.1 mGy. DLP: 1530 mGy-cm. .



LIMITATIONS:  None.



FINDINGS:  AXILLAE: No adenopathy.

CHEST WALL: No masses.  No subcutaneous air.

LUNGS: Left upper lobe pulmonary nodule is minimally smaller, measuring 12.4 x 8.5 mm.  Centrilobular
 emphysema is present in the upper lobes.  There is mild pleural/ parenchymal scarring in the right l
adam.

PLEURA: No effusions.  No calcifications.

THYROID: Thyroid is quite heterogeneous.  There is a large low-density lesion in the left lobe of the
 gland.

HILAR AND MEDIASTINAL STRUCTURES: No identified masses or abnormal nodes.

AORTA AND GREAT VESSELS: No aneurysm.  No dissection.

PULMONARY ARTERIES: No identified pulmonary emboli.  Study not optimized for the pulmonary arteries.

HEART: No pericardial effusion.  Coronary artery calcifications are present.

HARDWARE AND LIFELINES: Injection port on the left.

BONES: No significant finding.

OTHER: No other significant finding.



IMPRESSION:  Left upper lobe pulmonary nodule is slightly smaller.  Mild centrilobular emphysema.  He
terogeneous thyroid with a large low-density lesion in the left lobe.  Recommend thyroid ultrasound w
ith possible biopsy.



COMPARISON:  3/10/2020



RADIATION DOSE:  CT Rad equipment meets quality standard of care and radiation dose reduction techniq
ues were employed. CTDIvol: 9.4 - 11.1 mGy. DLP: 1530 mGy-cm. mGy.



TECHNIQUE:  CT scan of the abdomen and pelvis performed with intravenous and oral contrast using carolee
felipe scanning technique with dynamic intravenous contrast injection.  Images reviewed with lung, soft 
tissue and bone windows.  Reconstructed coronal and sagittal MPR images reviewed.  Delayed images for
 evaluation of the urinary system also acquired and evaluated. All images stored on PACS.

All CT scanners at this facility use dose modulation, iterative reconstruction, and/or weight based d
osing when appropriate to reduce radiation dose to as low as reasonably achievable (ALARA).

[facility]: Dose Right  [facility]: SureCare    [facility]: Dose Right    [facility]: Teradose 4D    [facility]: [employer]



FINDINGS:  LIVER: Normal size. No masses.  No dilated ducts.

SPLEEN: Normal size.  No focal lesions.

PANCREAS: No masses.  No significant calcifications.  No adjacent inflammation or peripancreatic flui
d collections.  Pancreatic duct not dilated.

GALLBLADDER: No identified stones by CT criteria. No inflammatory changes to suggest cholecystitis.

ADRENAL GLANDS: No significant masses or asymmetry.

RIGHT KIDNEY AND URETER: No solid masses.   No significant calcifications.   No hydronephrosis or hyd
roureter.

LEFT KIDNEY AND URETER: No solid masses.   No significant calcifications.   No hydronephrosis or hydr
oureter.

AORTA AND VESSELS: No aortic aneurysm.  There is a stable aneurysm of the right common iliac artery.

RETROPERITONEUM: No retroperitoneal adenopathy, hemorrhage or masses.

LARGE AND SMALL BOWEL: No dilatation.  No masses.  No wall thickening.

APPENDIX: Normal.

ABDOMINAL WALL: No hernia or masses.

PERITONEAL CAVITY: No free air.  No free fluid.  No peritoneal implants or masses.

PELVIS: No mass or free fluid.  Normal bladder.

BONES: No significant or acute findings.

OTHER: No other significant finding.



IMPRESSION:  There is no evidence of metastatic disease in the abdomen or pelvis.  There is stable an
eurysm of the right common iliac artery.



TECHNICAL DOCUMENTATION:  JOB ID:  [id number]

Quality ID # 436: Final reports with documentation of one or more dose reduction techniques (e.g., Au
tomated exposure control, adjustment of the mA and/or kV according to patient size, use of iterative 
reconstruction technique)

 2011 [employer]- All Rights Reserved



Reading location - IP/workstation name: [email or web address]

## 2020-05-28 NOTE — RADIOLOGY REPORT (SQ)
EXAM DESCRIPTION:  CT CHEST WITH; CT ABD/PELVIS WITH IV ONLY



IMAGES COMPLETED DATE/TIME:  5/28/2020 8:49 am



REASON FOR STUDY:  LUNG CANCER (C34.11) C34.11  MALIGNANT NEOPLASM OF UPPER LOBE, RIGHT BRONCHUS OR L




CONTRAST TYPE AND DOSE:  contrast/concentration: Isovue 350.00 mg/ml; Total Contrast Delivered: 100.0
 ml; Total Saline Delivered: 72.0 ml



RENAL FUNCTION:  Creatinine 1.1



COMPARISON:  3/10/2020



TECHNIQUE:  CT scan of the chest performed using helical scanning technique with dynamic intravenous 
contrast injection.  Images reviewed with lung, soft tissue and bone windows. Reconstructed coronal a
nd sagittal MPR images reviewed.  All images stored on PACS.

All CT scanners at this facility use dose modulation, iterative reconstruction, and/or weight based d
osing when appropriate to reduce radiation dose to as low as reasonably achievable (ALARA).

[facility]: Dose Right  [facility]: CareDose    [facility]: Dose Right    [facility]: Teradose 4D    [facility]: [employer]



RADIATION DOSE:  CT Rad equipment meets quality standard of care and radiation dose reduction techniq
ues were employed. CTDIvol: 9.4 - 11.1 mGy. DLP: 1530 mGy-cm. .



LIMITATIONS:  None.



FINDINGS:  AXILLAE: No adenopathy.

CHEST WALL: No masses.  No subcutaneous air.

LUNGS: Left upper lobe pulmonary nodule is minimally smaller, measuring 12.4 x 8.5 mm.  Centrilobular
 emphysema is present in the upper lobes.  There is mild pleural/ parenchymal scarring in the right l
adam.

PLEURA: No effusions.  No calcifications.

THYROID: Thyroid is quite heterogeneous.  There is a large low-density lesion in the left lobe of the
 gland.

HILAR AND MEDIASTINAL STRUCTURES: No identified masses or abnormal nodes.

AORTA AND GREAT VESSELS: No aneurysm.  No dissection.

PULMONARY ARTERIES: No identified pulmonary emboli.  Study not optimized for the pulmonary arteries.

HEART: No pericardial effusion.  Coronary artery calcifications are present.

HARDWARE AND LIFELINES: Injection port on the left.

BONES: No significant finding.

OTHER: No other significant finding.



IMPRESSION:  Left upper lobe pulmonary nodule is slightly smaller.  Mild centrilobular emphysema.  He
terogeneous thyroid with a large low-density lesion in the left lobe.  Recommend thyroid ultrasound w
ith possible biopsy.



COMPARISON:  3/10/2020



RADIATION DOSE:  CT Rad equipment meets quality standard of care and radiation dose reduction techniq
ues were employed. CTDIvol: 9.4 - 11.1 mGy. DLP: 1530 mGy-cm. mGy.



TECHNIQUE:  CT scan of the abdomen and pelvis performed with intravenous and oral contrast using carolee
felipe scanning technique with dynamic intravenous contrast injection.  Images reviewed with lung, soft 
tissue and bone windows.  Reconstructed coronal and sagittal MPR images reviewed.  Delayed images for
 evaluation of the urinary system also acquired and evaluated. All images stored on PACS.

All CT scanners at this facility use dose modulation, iterative reconstruction, and/or weight based d
osing when appropriate to reduce radiation dose to as low as reasonably achievable (ALARA).

[facility]: Dose Right  [facility]: SureCare    [facility]: Dose Right    [facility]: Teradose 4D    [facility]: Smart [employer]



FINDINGS:  LIVER: Normal size. No masses.  No dilated ducts.

SPLEEN: Normal size.  No focal lesions.

PANCREAS: No masses.  No significant calcifications.  No adjacent inflammation or peripancreatic flui
d collections.  Pancreatic duct not dilated.

GALLBLADDER: No identified stones by CT criteria. No inflammatory changes to suggest cholecystitis.

ADRENAL GLANDS: No significant masses or asymmetry.

RIGHT KIDNEY AND URETER: No solid masses.   No significant calcifications.   No hydronephrosis or hyd
roureter.

LEFT KIDNEY AND URETER: No solid masses.   No significant calcifications.   No hydronephrosis or hydr
oureter.

AORTA AND VESSELS: No aortic aneurysm.  There is a stable aneurysm of the right common iliac artery.

RETROPERITONEUM: No retroperitoneal adenopathy, hemorrhage or masses.

LARGE AND SMALL BOWEL: No dilatation.  No masses.  No wall thickening.

APPENDIX: Normal.

ABDOMINAL WALL: No hernia or masses.

PERITONEAL CAVITY: No free air.  No free fluid.  No peritoneal implants or masses.

PELVIS: No mass or free fluid.  Normal bladder.

BONES: No significant or acute findings.

OTHER: No other significant finding.



IMPRESSION:  There is no evidence of metastatic disease in the abdomen or pelvis.  There is stable an
eurysm of the right common iliac artery.



TECHNICAL DOCUMENTATION:  JOB ID:  [id number]

Quality ID # 436: Final reports with documentation of one or more dose reduction techniques (e.g., Au
tomated exposure control, adjustment of the mA and/or kV according to patient size, use of iterative 
reconstruction technique)

 2011 [employer]- All Rights Reserved



Reading location - IP/workstation name: [email or web address]

## 2020-05-28 NOTE — RADIOLOGY REPORT (SQ)
EXAM DESCRIPTION:  NM WHOLE BODY BONE SCAN



IMAGES COMPLETED DATE/TIME:  5/28/2020 3:03 pm



REASON FOR STUDY:  LUNG CANCER (C34.11) C34.11  MALIGNANT NEOPLASM OF UPPER LOBE, RIGHT BRONCHUS OR L




COMPARISON:  Bone scan 3/10/2020



RADIONUCLIDE AND DOSE:  20.6 millicuries Tc99m HDP.

The route of agent administration: Intravenous.



ADDITIONAL DRUGS AND DOSES:  None.



TECHNIQUE:  Routine delayed images at 3 hours post radionuclide injection acquired of the bony skelet
on including anterior and posterior whole-body projections and additional focused images as needed.



LIMITATIONS:  None.



FINDINGS:  BONES: There is a small area of increased activity in the right side of the calvarium.  Th
ere is increased uptake in the posterior right 7th rib versus tip of the right scapula.  There is mil
d increased uptake in the posterior left 7th rib.  There is uptake in the mid left humerus.  No new l
esion is seen.

KIDNEYS: Symmetric excretion without obstruction.

OTHER: No other significant finding.



IMPRESSION:  Metastatic disease to bone.



COMMENT:  Quality measure 147:  Current bone scan is compared with any available plain radiographs, p
rior bone scans, and CT/MRI.



TECHNICAL DOCUMENTATION:  JOB ID:  [id number]

 2011 [employer]- All Rights Reserved



Reading location - IP/workstation name: [email or web address]

## 2020-08-18 NOTE — RADIOLOGY REPORT (SQ)
EXAM DESCRIPTION:  CT ABD/PELVIS WITH IV ONLY



IMAGES COMPLETED DATE/TIME:  8/18/2020 9:22 am



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 
NEOPLASM OF UPPER LOBE, RIGHT BRONCHUS OR L



COMPARISON:  CT of the abdomen and pelvis with contrast from 5/28/2020.



TECHNIQUE:  CT scan of the abdomen and pelvis performed using helical scanning technique with dynamic
 intravenous contrast injection.  No oral contrast. Images reviewed with lung, soft tissue, and bone 
windows. Reconstructed coronal and sagittal MPR images reviewed. Delayed images for evaluation of the
 urinary system also acquired. All images stored on PACS.

All CT scanners at this facility use dose modulation, iterative reconstruction, and/or weight based d
osing when appropriate to reduce radiation dose to as low as reasonably achievable (ALARA).

[facility]: Dose Right  [facility]: CareDose    [facility]: Dose Right    [facility]: Teradose 4D    [facility]: Smart Technologies



CONTRAST TYPE AND DOSE:  Contrast/concentration: Isovue 350.00 mmol/ml; Total Contrast Delivered: 100
.0 ml; Total Saline Delivered: 45.0 ml



RENAL FUNCTION:  Creatinine 1 milligram/deciliter.



LIMITATIONS:  None.



FINDINGS:  LOWER CHEST:  Refer to the separate report of the CT of the chest.

LIVER: The morphology of the liver is noncirrhotic.  The portal veins are patent.  There is no hepati
c mass.

SPLEEN: No splenomegaly or splenic mass.

PANCREAS: No acute gross abnormality of the pancreas.

GALLBLADDER: Cholelithiasis.  There is no gallbladder wall thickening or pericholecystic inflammation
.

ADRENAL GLANDS:  Stable 9 mm left adrenal nodule (image 60 of series 604).

RIGHT KIDNEY AND URETER: The 13 x 11 mm hypodense lesion in the posterior cortex of the kidney (image
 37 of series 3) and the 6 mm hypodense lesion in the inferior cortex of the kidney are unchanged.  T
here is no hydronephrosis, nephrolithiasis, hydroureter or ureterolithiasis.

LEFT KIDNEY AND URETER: The 9 mm exophytic hypodense lesion in the posterior cortex of the kidney is 
unchanged.  There is no hydronephrosis, nephrolithiasis, hydroureter or ureterolithiasis.

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
  The left common iliac artery and right internal iliac artery are ectatic.  There is no aneurysm or 
dissection of the abdominal aorta

RETROPERITONEUM: No retroperitoneal adenopathy, hemorrhage or mass.

BOWEL AND PERITONEAL CAVITY: Colonic diverticulosis without diverticulitis.  There is no bowel obstru
ction, bowel wall thickening or pericolonic/ perienteric inflammation.  There is no mesenteric adenop
athy, free intraperitoneal fluid or mesenteric/ omental inflammation.

APPENDIX: Normal.

PELVIS: The urinary bladder is contracted.  There are fiducial markers or surgical clips inferior to 
the urinary bladder.  There is no pelvic adenopathy.

ABDOMINAL WALL: No mass or hernia.

BONES: No acute fracture or osseous lesion.

OTHER: No other finding.



IMPRESSION:   No acute intra-abdominal abnormality or evidence of metastases.



TECHNICAL DOCUMENTATION:  JOB ID:  [id number]

Quality ID # 436: Final reports with documentation of one or more dose reduction techniques (e.g., Au
tomated exposure control, adjustment of the mA and/or kV according to patient size, use of iterative 
reconstruction technique)

 2011 [employer]- All Rights Reserved



Reading location - IP/workstation name: [email or web address]

## 2020-08-18 NOTE — RADIOLOGY REPORT (SQ)
EXAM DESCRIPTION:  CT CHEST WITH



IMAGES COMPLETED DATE/TIME:  8/18/2020 9:22 am



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 
NEOPLASM OF UPPER LOBE, RIGHT BRONCHUS OR L



COMPARISON:  CT of the chest with contrast from 5/28/2020.



TECHNIQUE:  CT scan of the chest performed using helical scanning technique with dynamic intravenous 
contrast injection.  Images reviewed with lung, soft tissue and bone windows.  Reconstructed coronal 
and sagittal MPR and MIP images reviewed.  All images stored on PACS.

All CT scanners at this facility use dose modulation, iterative reconstruction, and/or weight based d
osing when appropriate to reduce radiation dose to as low as reasonably achievable (ALARA).

[facility]: Dose Right  [facility]: CareDose    [facility]: Dose Right    [facility]: Teradose 4D    [facility]: [employer]



CONTRAST TYPE AND DOSE:  100  mL Omnipaque 350- low osmolar.



RENAL FUNCTION:  Creatinine 1 milligram/deciliter.



RADIATION DOSE:  CT Rad equipment meets quality standard of care and radiation dose reduction techniq
ues were employed. CTDIvol: 10.8 - 13.2 mGy. DLP: 1788 mGy-cm.



LIMITATIONS:  None.



FINDINGS:  LUNGS AND PLEURA: The trachea and main bronchi are patent.  The abruption creation of the 
middle lobar bronchus is consistent with prior lobectomy.  The nodular densities around the metallic 
staple line in the left upper lobe (image 18 of series 2) are unchanged.  The peripheral reticular op
acities in the right lower lobe in the areas of pleural thickening in the posterior aspect of the hem
i-thoraces are also unchanged.  There is no acute consolidation, ground-glass opacification, pleural 
effusion or new / enlarging pulmonary nodule.

HILAR AND MEDIASTINAL STRUCTURES: Stable nonenlarged right lower paratracheal lymph node (image 19 of
 series 2).  There is no enlarged hilar adenopathy.

HEART AND VASCULAR STRUCTURES: No aneurysm or dissection of the thoracic aorta.  There is mild-to-mod
erate atherosclerotic calcification of the coronary arteries.  There is no cardiomegaly or pericardia
l effusion

HARDWARE: The tip of the left IJ port terminates within the SVC.

UPPER ABDOMEN:  Refer to the separate report of the CT of the abdomen.

THYROID AND OTHER SOFT TISSUES: The thyroid gland is enlarged and heterogeneous.

BONES: Unchanged scalloped appearance of the right 7th rib.

OTHER: No other finding.



IMPRESSION:  Postoperative findings consistent with prior right middle lobectomy and sublobar resecti
on in the left upper lobe.  The nodular densities around the metallic staple line in the left upper l
obe (image 18 of series 2) are unchanged.



TECHNICAL DOCUMENTATION:  JOB ID:  [id number]

Quality ID # 436: Final reports with documentation of one or more dose reduction techniques (e.g., Au
tomated exposure control, adjustment of the mA and/or kV according to patient size, use of iterative 
reconstruction technique)

 2011 [employer]- All Rights Reserved



Reading location - IP/workstation name: [email or web address]

## 2020-10-13 NOTE — RADIOLOGY REPORT (SQ)
EXAM DESCRIPTION:  MRI HEAD COMBO



IMAGES COMPLETED DATE/TIME:  10/13/2020 11:23 am



REASON FOR STUDY:  (C34.11)MALIGNANT NEOPLASM OF UPPER LOBE, RIGHT BRONCHUS OR LUNG C34.11  MALIGNANT
 NEOPLASM OF UPPER LOBE, RIGHT BRONCHUS OR L



COMPARISON:  None.



TECHNIQUE:   Multiplanar imaging includes noncontrasted T1, T2, FLAIR, and Diffusion with ADC map seq
uences. Contrast enhanced T1 images. Images stored on PACS.



CONTRAST TYPE AND DOSE:  20 mL Prohance.



RENAL FUNCTION:  Not indicated.  ACR Type II contrast agent associated with few, if any, unconfounded
 cases of NSF



LIMITATIONS:  None.



FINDINGS:  ANATOMY: No anomalies. Normal vascular flow voids. Pituitary fossa normal.

CSF SPACES: Normal size and contour. No hemorrhage.

CEREBRUM: A few high-signal intensity lesions scattered throughout the white matter on FLAIR imaging 
with distribution suggesting chronic microvascular ischemic change. Sulci and gyri normal in size and
 contour. No evidence of hemorrhage, mass or extraaxial fluid collection. No enhancing lesions.

POSTERIOR FOSSA: No signal alteration. No hemorrhage. No edema, masses or mass effect. Internal audit
ory canals, cerebello-pontine angles, mastoids normal.

DIFFUSION: Negative for acute or subacute infarction.

ORBITS: No masses. Globes normal.

PARANASAL SINUSES: No fluid levels. Mucosa normal.

OTHER: Approximately 10 mm enhancing nodule in the right parietal calvarium consistent with bone meta
stasis.



IMPRESSION:  Bone metastasis.  No evidence of brain metastasis.

EVIDENCE OF ACUTE STROKE: NO.



TECHNICAL DOCUMENTATION:  JOB ID:  [id number]

 2011 [employer]- All Rights Reserved



Reading location - IP/workstation name: [email or web address]

## 2020-11-18 NOTE — RADIOLOGY REPORT (SQ)
EXAM DESCRIPTION:  CT ABD/PELVIS WITH IV ONLY



IMAGES COMPLETED DATE/TIME:  11/17/2020 10:38 am



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 
NEOPLASM OF UPPER LOBE, RIGHT BRONCHUS OR L



COMPARISON:  8/18/2020



TECHNIQUE:  CT scan of the abdomen and pelvis performed using helical scanning technique with dynamic
 intravenous contrast injection.  No oral contrast. Images reviewed with lung, soft tissue, and bone 
windows. Reconstructed coronal and sagittal MPR images reviewed. Delayed images for evaluation of the
 urinary system also acquired. All images stored on PACS.

All CT scanners at this facility use dose modulation, iterative reconstruction, and/or weight based d
osing when appropriate to reduce radiation dose to as low as reasonably achievable (ALARA).

[facility]: Dose Right  [facility]: CareDose    [facility]: Dose Right    [facility]: Teradose 4D    [facility]: [employer]



CONTRAST TYPE AND DOSE:  contrast/concentration: Isovue 350.00 mmol/ml; Total Contrast Delivered: 100
.0 ml; Total Saline Delivered: 72.0 ml



RENAL FUNCTION:  GFR > 60.



RADIATION DOSE:  .



LIMITATIONS:  None.



FINDINGS:  LOWER CHEST: See separate report of the CT of the chest.

LIVER: Normal size. No masses.  No dilated ducts.

SPLEEN: Normal size. No focal lesions.

PANCREAS: No masses. No significant calcifications. No adjacent inflammation or peripancreatic fluid 
collections. Pancreatic duct not dilated.

GALLBLADDER: Gallstones. No inflammatory changes to suggest cholecystitis.

ADRENAL GLANDS: No significant masses or asymmetry.

RIGHT KIDNEY AND URETER: No solid masses.   No significant calcifications.   No hydronephrosis or hyd
roureter.

LEFT KIDNEY AND URETER: No solid masses.   No significant calcifications.   No hydronephrosis or hydr
oureter.

AORTA AND VESSELS: Unchanged right common iliac aneurysm.

RETROPERITONEUM: No retroperitoneal adenopathy, hemorrhage or masses.

BOWEL AND PERITONEAL CAVITY: No masses or inflammatory changes. No free fluid or peritoneal masses.

APPENDIX: Normal.

PELVIS: No mass.  No free fluid. Normal bladder.

ABDOMINAL WALL: Diastases of the rectus musculature.

BONES: No acute findings.

OTHER: No other significant finding.



IMPRESSION:  No evidence of metastatic disease.



TECHNICAL DOCUMENTATION:  JOB ID:  [id number]

Quality ID # 436: Final reports with documentation of one or more dose reduction techniques (e.g., Au
tomated exposure control, adjustment of the mA and/or kV according to patient size, use of iterative 
reconstruction technique)

 2011 [employer]- All Rights Reserved



Reading location - IP/workstation name: [email or web address]

## 2020-11-18 NOTE — RADIOLOGY REPORT (SQ)
EXAM DESCRIPTION:  CT CHEST WITH



IMAGES COMPLETED DATE/TIME:  11/17/2020 10:38 am



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 
NEOPLASM OF UPPER LOBE, RIGHT BRONCHUS OR L



COMPARISON:  8/18/2020



TECHNIQUE:  CT scan of the chest performed using helical scanning technique with dynamic intravenous 
contrast injection.  Images reviewed with lung, soft tissue and bone windows.  Reconstructed coronal 
and sagittal MPR and MIP images reviewed.  All images stored on PACS.

All CT scanners at this facility use dose modulation, iterative reconstruction, and/or weight based d
osing when appropriate to reduce radiation dose to as low as reasonably achievable (ALARA).

[facility]: Dose Right  [facility]: CareDose    [facility]: Dose Right    [facility]: Teradose 4D    [facility]: Smart [employer]



RENAL FUNCTION:  GFR > 60.



RADIATION DOSE:  CT Rad equipment meets quality standard of care and radiation dose reduction techniq
ues were employed. CTDIvol: 11.6 - 12.3 mGy. DLP: 1804 mGy-cm. .



LIMITATIONS:  None.



FINDINGS:  LUNGS AND PLEURA: Postsurgical changes in both lungs.  Stable pleural thickening along the
 left major fissure.  No developing nodules or infiltrate.  No effusions.

HILAR AND MEDIASTINAL STRUCTURES: No identified masses or abnormal nodes.

HEART AND VASCULAR STRUCTURES: No aneurysm or dissection.  No central pulmonary emboli.  No pericardi
al effusion.

HARDWARE: None in the chest.

UPPER ABDOMEN: See separate report of the CT of the abdomen.

THYROID AND OTHER SOFT TISSUES: Stable thyroid nodules.

BONES: No acute findings.

OTHER: Left-sided port tip SVC.



IMPRESSION:  Stable appearance of the chest.



TECHNICAL DOCUMENTATION:  JOB ID:  [id number]

Quality ID # 436: Final reports with documentation of one or more dose reduction techniques (e.g., Au
tomated exposure control, adjustment of the mA and/or kV according to patient size, use of iterative 
reconstruction technique)

 2011 [employer]- All Rights Reserved



Reading location - IP/workstation name: [email or web address]

## 2023-01-30 NOTE — RADIOLOGY REPORT (SQ)
EXAM DESCRIPTION:  CT CHEST WITHOUT



COMPLETED DATE/TIME:  10/11/2017 8:32 am



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



COMPARISON:  7/11/2017.



TECHNIQUE:  CT scan performed of the chest without intravenous contrast.  Images reviewed with lung, 
soft tissue and bone windows.  Reconstructed coronal and sagittal MPR images reviewed.  All images st
ored on PACS.

All CT scanners at this facility use dose modulation, iterative reconstruction, and/or weight based d
osing when appropriate to reduce radiation dose to as low as reasonably achievable (ALARA).

[facility]: Dose Right  [facility]: CareDose    [facility]: Dose Right    [facility]: Teradose 4D    [facility]: [employer]



RADIATION DOSE:  Up-to-date CT equipment and radiation dose reduction techniques were employed. CTDIv
ol: 15.7 mGy. DLP: 572 mGy-cm. mGy.



LIMITATIONS:  No technical limitations, however non use of IV contrast limits assessment of the vesse
ls.



FINDINGS:  LUNGS AND PLEURA: Focal nodular airspace disease in the left upper lobe abuts the left brando
or fissure.  Similar appearance measuring up to just under 2 cm maximal transverse dimension.  This l
ooks like scar.  Additional areas of scarring along with motion artifact.  No developing nodules or m
asses or infiltrates, however.  Trace right pleural fluid has developed.

HILAR AND MEDIASTINAL STRUCTURES: Numerous small nodes in the mediastinum, relatively stable and nonp
rogressive.  Generally subcentimeter.

HEART AND VASCULAR STRUCTURES: Cardiac enlargement with coronary calcification.  No pericardial effus
ion.  Mild dilatation ascending aorta just over 4 cm.  Stable appearance.

UPPER ABDOMEN: No developing adrenal or liver mass.

THYROID AND OTHER SOFT TISSUES: Enlarged thyroid with low-density nodule on the left, chronic.  No gr
oss supraclavicular or axillary adenopathy.

BONES: No developing bone lesions.

HARDWARE: None in the chest.

OTHER: No other significant findings.



IMPRESSION:  1.  Stable appearance.  Postoperative changes in the lungs have not progressed.  No deve
loping lesions.  Mild shotty mediastinal adenopathy is also stable.



TECHNICAL DOCUMENTATION:  JOB ID:  [id number]

Quality ID # 436: Final reports with documentation of one or more dose reduction techniques (e.g., Au
tomated exposure control, adjustment of the mA and/or kV according to patient size, use of iterative 
reconstruction technique)

 2011 [employer]- All Rights Reserved day(s)
